# Patient Record
Sex: FEMALE | Race: ASIAN | NOT HISPANIC OR LATINO | URBAN - METROPOLITAN AREA
[De-identification: names, ages, dates, MRNs, and addresses within clinical notes are randomized per-mention and may not be internally consistent; named-entity substitution may affect disease eponyms.]

---

## 2017-08-21 ENCOUNTER — OUTPATIENT (OUTPATIENT)
Dept: OUTPATIENT SERVICES | Facility: HOSPITAL | Age: 42
LOS: 1 days | End: 2017-08-21

## 2017-08-21 VITALS
RESPIRATION RATE: 14 BRPM | WEIGHT: 149.03 LBS | HEIGHT: 61.25 IN | HEART RATE: 64 BPM | DIASTOLIC BLOOD PRESSURE: 70 MMHG | TEMPERATURE: 98 F | SYSTOLIC BLOOD PRESSURE: 104 MMHG

## 2017-08-21 DIAGNOSIS — D25.9 LEIOMYOMA OF UTERUS, UNSPECIFIED: ICD-10-CM

## 2017-08-21 DIAGNOSIS — Z01.818 ENCOUNTER FOR OTHER PREPROCEDURAL EXAMINATION: ICD-10-CM

## 2017-08-21 LAB
BLD GP AB SCN SERPL QL: NEGATIVE — SIGNIFICANT CHANGE UP
HCG SERPL-ACNC: < 5 MIU/ML — SIGNIFICANT CHANGE UP
HCT VFR BLD CALC: 39.2 % — SIGNIFICANT CHANGE UP (ref 34.5–45)
HGB BLD-MCNC: 12.8 G/DL — SIGNIFICANT CHANGE UP (ref 11.5–15.5)
MCHC RBC-ENTMCNC: 28.3 PG — SIGNIFICANT CHANGE UP (ref 27–34)
MCHC RBC-ENTMCNC: 32.7 % — SIGNIFICANT CHANGE UP (ref 32–36)
MCV RBC AUTO: 86.5 FL — SIGNIFICANT CHANGE UP (ref 80–100)
NRBC # FLD: 0 — SIGNIFICANT CHANGE UP
PLATELET # BLD AUTO: 259 K/UL — SIGNIFICANT CHANGE UP (ref 150–400)
PMV BLD: 11.2 FL — SIGNIFICANT CHANGE UP (ref 7–13)
RBC # BLD: 4.53 M/UL — SIGNIFICANT CHANGE UP (ref 3.8–5.2)
RBC # FLD: 14.1 % — SIGNIFICANT CHANGE UP (ref 10.3–14.5)
RH IG SCN BLD-IMP: POSITIVE — SIGNIFICANT CHANGE UP
WBC # BLD: 4.91 K/UL — SIGNIFICANT CHANGE UP (ref 3.8–10.5)
WBC # FLD AUTO: 4.91 K/UL — SIGNIFICANT CHANGE UP (ref 3.8–10.5)

## 2017-08-21 RX ORDER — SODIUM CHLORIDE 9 MG/ML
3 INJECTION INTRAMUSCULAR; INTRAVENOUS; SUBCUTANEOUS EVERY 8 HOURS
Qty: 0 | Refills: 0 | Status: DISCONTINUED | OUTPATIENT
Start: 2017-09-06 | End: 2017-09-08

## 2017-08-21 RX ORDER — SODIUM CHLORIDE 9 MG/ML
1000 INJECTION, SOLUTION INTRAVENOUS
Qty: 0 | Refills: 0 | Status: DISCONTINUED | OUTPATIENT
Start: 2017-09-06 | End: 2017-09-07

## 2017-08-21 NOTE — H&P PST ADULT - PROBLEM SELECTOR PLAN 1
Scheduled for abdominal myomectomy on 09/06/17. Pre op instructions, famotidine, chlorhexidine gluconate soap given and explained. Pt verbalized understanding.

## 2017-08-21 NOTE — H&P PST ADULT - NSANTHOSAYNRD_GEN_A_CORE
No. CRISTIAN screening performed.  STOP BANG Legend: 0-2 = LOW Risk; 3-4 = INTERMEDIATE Risk; 5-8 = HIGH Risk

## 2017-08-21 NOTE — H&P PST ADULT - FAMILY HISTORY
Mother  Still living? Yes, Estimated age: 61-70  Type 2 diabetes mellitus, Age at diagnosis: Age Unknown  Family history of osteoporosis in mother, Age at diagnosis: Age Unknown     Father  Still living? No  Family history of liver cancer, Age at diagnosis: Age Unknown  Family history of hepatitis C, Age at diagnosis: Age Unknown

## 2017-08-21 NOTE — H&P PST ADULT - HISTORY OF PRESENT ILLNESS
41 y/o female 43 y/o female with no significant PMH presents to PST for pre op evaluation with hx of uterine fibroids found on C t scan of abd. on 04/2017. Now scheduled for abdominal myomectomy on 09/06/17

## 2017-08-21 NOTE — H&P PST ADULT - NEGATIVE GASTROINTESTINAL SYMPTOMS
no abdominal pain/no hiccoughs/no nausea/no flatulence/no vomiting/no change in bowel habits/no melena/no diarrhea

## 2017-08-21 NOTE — H&P PST ADULT - RS GEN PE MLT RESP DETAILS PC
no intercostal retractions/no chest wall tenderness/good air movement/no rales/no subcutaneous emphysema/no rhonchi/no wheezes/breath sounds equal/airway patent/respirations non-labored

## 2017-08-21 NOTE — H&P PST ADULT - NEGATIVE NEUROLOGICAL SYMPTOMS
no vertigo/no loss of consciousness/no hemiparesis/no paresthesias/no confusion/no transient paralysis/no generalized seizures/no facial palsy/no difficulty walking/no focal seizures/no syncope/no weakness/no tremors

## 2017-08-21 NOTE — H&P PST ADULT - NEGATIVE CARDIOVASCULAR SYMPTOMS
no paroxysmal nocturnal dyspnea/no claudication/no orthopnea/no chest pain/no palpitations/no dyspnea on exertion

## 2017-09-06 ENCOUNTER — RESULT REVIEW (OUTPATIENT)
Age: 42
End: 2017-09-06

## 2017-09-06 ENCOUNTER — INPATIENT (INPATIENT)
Facility: HOSPITAL | Age: 42
LOS: 1 days | Discharge: HOME | End: 2017-09-08
Attending: OBSTETRICS & GYNECOLOGY | Admitting: OBSTETRICS & GYNECOLOGY
Payer: COMMERCIAL

## 2017-09-06 ENCOUNTER — TRANSCRIPTION ENCOUNTER (OUTPATIENT)
Age: 42
End: 2017-09-06

## 2017-09-06 VITALS
DIASTOLIC BLOOD PRESSURE: 81 MMHG | WEIGHT: 149.03 LBS | RESPIRATION RATE: 16 BRPM | SYSTOLIC BLOOD PRESSURE: 108 MMHG | OXYGEN SATURATION: 100 % | HEART RATE: 66 BPM | TEMPERATURE: 99 F | HEIGHT: 61.25 IN

## 2017-09-06 DIAGNOSIS — Z98.890 OTHER SPECIFIED POSTPROCEDURAL STATES: ICD-10-CM

## 2017-09-06 DIAGNOSIS — D25.9 LEIOMYOMA OF UTERUS, UNSPECIFIED: ICD-10-CM

## 2017-09-06 LAB
HCG UR QL: NEGATIVE — SIGNIFICANT CHANGE UP
RH IG SCN BLD-IMP: POSITIVE — SIGNIFICANT CHANGE UP

## 2017-09-06 PROCEDURE — 88305 TISSUE EXAM BY PATHOLOGIST: CPT | Mod: 26

## 2017-09-06 PROCEDURE — 88342 IMHCHEM/IMCYTCHM 1ST ANTB: CPT | Mod: 26

## 2017-09-06 RX ORDER — HYDROMORPHONE HYDROCHLORIDE 2 MG/ML
0.5 INJECTION INTRAMUSCULAR; INTRAVENOUS; SUBCUTANEOUS
Qty: 0 | Refills: 0 | Status: DISCONTINUED | OUTPATIENT
Start: 2017-09-06 | End: 2017-09-07

## 2017-09-06 RX ORDER — SODIUM CHLORIDE 9 MG/ML
1000 INJECTION, SOLUTION INTRAVENOUS
Qty: 0 | Refills: 0 | Status: DISCONTINUED | OUTPATIENT
Start: 2017-09-06 | End: 2017-09-07

## 2017-09-06 RX ORDER — HYDROMORPHONE HYDROCHLORIDE 2 MG/ML
30 INJECTION INTRAMUSCULAR; INTRAVENOUS; SUBCUTANEOUS
Qty: 0 | Refills: 0 | Status: DISCONTINUED | OUTPATIENT
Start: 2017-09-06 | End: 2017-09-07

## 2017-09-06 RX ORDER — ONDANSETRON 8 MG/1
4 TABLET, FILM COATED ORAL EVERY 6 HOURS
Qty: 0 | Refills: 0 | Status: DISCONTINUED | OUTPATIENT
Start: 2017-09-06 | End: 2017-09-08

## 2017-09-06 RX ORDER — HEPARIN SODIUM 5000 [USP'U]/ML
5000 INJECTION INTRAVENOUS; SUBCUTANEOUS EVERY 8 HOURS
Qty: 0 | Refills: 0 | Status: DISCONTINUED | OUTPATIENT
Start: 2017-09-06 | End: 2017-09-08

## 2017-09-06 RX ORDER — ONDANSETRON 8 MG/1
4 TABLET, FILM COATED ORAL ONCE
Qty: 0 | Refills: 0 | Status: DISCONTINUED | OUTPATIENT
Start: 2017-09-06 | End: 2017-09-08

## 2017-09-06 RX ORDER — NALOXONE HYDROCHLORIDE 4 MG/.1ML
0.1 SPRAY NASAL
Qty: 0 | Refills: 0 | Status: DISCONTINUED | OUTPATIENT
Start: 2017-09-06 | End: 2017-09-07

## 2017-09-06 RX ADMIN — SODIUM CHLORIDE 125 MILLILITER(S): 9 INJECTION, SOLUTION INTRAVENOUS at 17:16

## 2017-09-06 RX ADMIN — HYDROMORPHONE HYDROCHLORIDE 0.5 MILLIGRAM(S): 2 INJECTION INTRAMUSCULAR; INTRAVENOUS; SUBCUTANEOUS at 17:26

## 2017-09-06 RX ADMIN — HYDROMORPHONE HYDROCHLORIDE 30 MILLILITER(S): 2 INJECTION INTRAMUSCULAR; INTRAVENOUS; SUBCUTANEOUS at 21:37

## 2017-09-06 RX ADMIN — HYDROMORPHONE HYDROCHLORIDE 0.5 MILLIGRAM(S): 2 INJECTION INTRAMUSCULAR; INTRAVENOUS; SUBCUTANEOUS at 17:16

## 2017-09-06 RX ADMIN — HYDROMORPHONE HYDROCHLORIDE 30 MILLILITER(S): 2 INJECTION INTRAMUSCULAR; INTRAVENOUS; SUBCUTANEOUS at 17:45

## 2017-09-06 RX ADMIN — HEPARIN SODIUM 5000 UNIT(S): 5000 INJECTION INTRAVENOUS; SUBCUTANEOUS at 23:01

## 2017-09-06 RX ADMIN — SODIUM CHLORIDE 3 MILLILITER(S): 9 INJECTION INTRAMUSCULAR; INTRAVENOUS; SUBCUTANEOUS at 21:04

## 2017-09-06 RX ADMIN — HYDROMORPHONE HYDROCHLORIDE 0.5 MILLIGRAM(S): 2 INJECTION INTRAMUSCULAR; INTRAVENOUS; SUBCUTANEOUS at 17:42

## 2017-09-06 NOTE — PROGRESS NOTE ADULT - PROBLEM SELECTOR PLAN 1
Neuro: PCA for pain  CV: Hemodynamically stable. f/u AM CBC  Pulm: Saturating well on room air, encourage oob/amb  GI:  Clear liquid diet.  consider advancing to regular diet in AM.   : UOP adequate, continue meredith  Heme: c/w HSQ and SCDs for DVT ppx  Dispo: Continue routine post-op care    Suzan Tonny PGY-2

## 2017-09-06 NOTE — PROGRESS NOTE ADULT - SUBJECTIVE AND OBJECTIVE BOX
R2 GYN POST OP   POD#0      Patient seen and examined at bedside.  Reports pain well controlled.  Patient is ambulating minimally.  Has only had a few sips of water.  Reports some nausea- no emesis.    Has not yet passed  Gutiérrez is still in place.   Denies CP, SOB, fevers, and chills.    Vital Signs Last 24 Hours  T(C): 36.5 (09-06-17 @ 20:27), Max: 37.2 (09-06-17 @ 13:06)  HR: 74 (09-06-17 @ 20:27) (64 - 77)  BP: 122/71 (09-06-17 @ 20:27) (108/81 - 137/85)  RR: 20 (09-06-17 @ 20:27) (13 - 20)  SpO2: 98% (09-06-17 @ 20:27) (97% - 100%)    I&O's Summary    06 Sep 2017 07:01  -  06 Sep 2017 22:00  --------------------------------------------------------  IN: 575 mL / OUT: 65 mL / NET: 510 mL        Physical Exam:  General: NAD  CV: RR, S1, S2, no M/R/G  Lungs: CTA b/l, good air flow b/l   Abdomen: Soft, appropriately-tender, softly distended, +BS  Incision: low transverse incision- CDI  Ext: No pain or swelling       MEDICATIONS  (STANDING):  sodium chloride 0.9% lock flush 3 milliLiter(s) IV Push every 8 hours  lactated ringers. 1000 milliLiter(s) (30 mL/Hr) IV Continuous <Continuous>  heparin  Injectable 5000 Unit(s) SubCutaneous every 8 hours  lactated ringers. 1000 milliLiter(s) (125 mL/Hr) IV Continuous <Continuous>  HYDROmorphone PCA (1 mG/mL) 30 milliLiter(s) PCA Continuous PCA Continuous    MEDICATIONS  (PRN):  naloxone Injectable 0.1 milliGRAM(s) IV Push every 3 minutes PRN For ANY of the following changes in patient status:  A. RR LESS THAN 10 breaths per minute, B. Oxygen saturation LESS THAN 90%, C. Sedation score of 6  ondansetron Injectable 4 milliGRAM(s) IV Push every 6 hours PRN Nausea  HYDROmorphone  Injectable 0.5 milliGRAM(s) IV Push every 10 minutes PRN Severe Pain (7 - 10)  ondansetron Injectable 4 milliGRAM(s) IV Push once PRN Nausea and/or Vomiting  HYDROmorphone PCA (1 mG/mL) Rescue Clinician Bolus 0.5 milliGRAM(s) IV Push every 15 minutes PRN for Pain Scale GREATER THAN 6  naloxone Injectable 0.1 milliGRAM(s) IV Push every 3 minutes PRN For ANY of the following changes in patient status:  A. RR LESS THAN 10 breaths per minute, B. Oxygen saturation LESS THAN 90%, C. Sedation score of 6  ondansetron Injectable 4 milliGRAM(s) IV Push every 6 hours PRN Nausea

## 2017-09-07 ENCOUNTER — TRANSCRIPTION ENCOUNTER (OUTPATIENT)
Age: 42
End: 2017-09-07

## 2017-09-07 LAB
HCT VFR BLD CALC: 36.2 % — SIGNIFICANT CHANGE UP (ref 34.5–45)
HGB BLD-MCNC: 11.8 G/DL — SIGNIFICANT CHANGE UP (ref 11.5–15.5)
MCHC RBC-ENTMCNC: 27.7 PG — SIGNIFICANT CHANGE UP (ref 27–34)
MCHC RBC-ENTMCNC: 32.6 % — SIGNIFICANT CHANGE UP (ref 32–36)
MCV RBC AUTO: 85 FL — SIGNIFICANT CHANGE UP (ref 80–100)
NRBC # FLD: 0 — SIGNIFICANT CHANGE UP
PLATELET # BLD AUTO: 229 K/UL — SIGNIFICANT CHANGE UP (ref 150–400)
PMV BLD: 11.3 FL — SIGNIFICANT CHANGE UP (ref 7–13)
RBC # BLD: 4.26 M/UL — SIGNIFICANT CHANGE UP (ref 3.8–5.2)
RBC # FLD: 14.1 % — SIGNIFICANT CHANGE UP (ref 10.3–14.5)
WBC # BLD: 11.56 K/UL — HIGH (ref 3.8–10.5)
WBC # FLD AUTO: 11.56 K/UL — HIGH (ref 3.8–10.5)

## 2017-09-07 RX ORDER — ACETAMINOPHEN 500 MG
975 TABLET ORAL EVERY 6 HOURS
Qty: 0 | Refills: 0 | Status: DISCONTINUED | OUTPATIENT
Start: 2017-09-07 | End: 2017-09-08

## 2017-09-07 RX ORDER — OXYCODONE HYDROCHLORIDE 5 MG/1
5 TABLET ORAL EVERY 4 HOURS
Qty: 0 | Refills: 0 | Status: DISCONTINUED | OUTPATIENT
Start: 2017-09-07 | End: 2017-09-08

## 2017-09-07 RX ORDER — IBUPROFEN 200 MG
600 TABLET ORAL
Qty: 0 | Refills: 0 | Status: DISCONTINUED | OUTPATIENT
Start: 2017-09-07 | End: 2017-09-07

## 2017-09-07 RX ORDER — IBUPROFEN 200 MG
600 TABLET ORAL EVERY 6 HOURS
Qty: 0 | Refills: 0 | Status: DISCONTINUED | OUTPATIENT
Start: 2017-09-07 | End: 2017-09-08

## 2017-09-07 RX ADMIN — SODIUM CHLORIDE 3 MILLILITER(S): 9 INJECTION INTRAMUSCULAR; INTRAVENOUS; SUBCUTANEOUS at 13:26

## 2017-09-07 RX ADMIN — SODIUM CHLORIDE 125 MILLILITER(S): 9 INJECTION, SOLUTION INTRAVENOUS at 07:40

## 2017-09-07 RX ADMIN — SODIUM CHLORIDE 3 MILLILITER(S): 9 INJECTION INTRAMUSCULAR; INTRAVENOUS; SUBCUTANEOUS at 23:16

## 2017-09-07 RX ADMIN — Medication 600 MILLIGRAM(S): at 18:34

## 2017-09-07 RX ADMIN — HEPARIN SODIUM 5000 UNIT(S): 5000 INJECTION INTRAVENOUS; SUBCUTANEOUS at 06:55

## 2017-09-07 RX ADMIN — Medication 600 MILLIGRAM(S): at 18:02

## 2017-09-07 RX ADMIN — ONDANSETRON 4 MILLIGRAM(S): 8 TABLET, FILM COATED ORAL at 18:05

## 2017-09-07 RX ADMIN — Medication 975 MILLIGRAM(S): at 18:01

## 2017-09-07 RX ADMIN — HYDROMORPHONE HYDROCHLORIDE 30 MILLILITER(S): 2 INJECTION INTRAMUSCULAR; INTRAVENOUS; SUBCUTANEOUS at 07:21

## 2017-09-07 RX ADMIN — SODIUM CHLORIDE 3 MILLILITER(S): 9 INJECTION INTRAMUSCULAR; INTRAVENOUS; SUBCUTANEOUS at 05:32

## 2017-09-07 RX ADMIN — HEPARIN SODIUM 5000 UNIT(S): 5000 INJECTION INTRAVENOUS; SUBCUTANEOUS at 14:46

## 2017-09-07 NOTE — PROGRESS NOTE ADULT - ASSESSMENT
POD #1  abdominal myomectomy  not tolerating PCA  bruising at incision, no mass     PLAN  d/c PCA  change to PO  increase ambulation  observation of incision     WALI Oliva

## 2017-09-07 NOTE — DISCHARGE NOTE ADULT - MEDICATION SUMMARY - MEDICATIONS TO CHANGE
I will SWITCH the dose or number of times a day I take the medications listed below when I get home from the hospital:    oxyCODONE 5 mg oral tablet  -- 1 tab(s) by mouth every 6 hours, As Needed -for severe pain MDD:4  -- Caution federal law prohibits the transfer of this drug to any person other  than the person for whom it was prescribed.  It is very important that you take or use this exactly as directed.  Do not skip doses or discontinue unless directed by your doctor.  May cause drowsiness.  Alcohol may intensify this effect.  Use care when operating dangerous machinery.  This prescription cannot be refilled.  Using more of this medication than prescribed may cause serious breathing problems.    acetaminophen 325 mg oral tablet  -- 3 tab(s) by mouth every 6 hours    ibuprofen 600 mg oral tablet  -- 1 tab(s) by mouth every 6 hours

## 2017-09-07 NOTE — DISCHARGE NOTE ADULT - CARE PROVIDER_API CALL
Torri Oliva), Obstetrics and Gynecology  [address]  [address]  [place] [address]  Phone: [phone]  Fax: [phone]

## 2017-09-07 NOTE — DISCHARGE NOTE ADULT - MEDICATION SUMMARY - MEDICATIONS TO TAKE
I will START or STAY ON the medications listed below when I get home from the hospital:    Pre She vitamin  1 tab orally daily  last dose   --     -- Indication: For home med    Calcium with Vitamin D  750 mg orally daily  --     -- Indication: For home med    Vitamin B  1 tab orally daily  --     -- Indication: For home med    oxyCODONE 5 mg oral tablet  -- 1 tab(s) by mouth every 6 hours, As Needed -for severe pain MDD:4  -- Caution federal law prohibits the transfer of this drug to any person other  than the person for whom it was prescribed.  It is very important that you take or use this exactly as directed.  Do not skip doses or discontinue unless directed by your doctor.  May cause drowsiness.  Alcohol may intensify this effect.  Use care when operating dangerous machinery.  This prescription cannot be refilled.  Using more of this medication than prescribed may cause serious breathing problems.    -- Indication: For severe pain    acetaminophen 325 mg oral tablet  -- 3 tab(s) by mouth every 6 hours  -- Indication: For Pain    ibuprofen 600 mg oral tablet  -- 1 tab(s) by mouth every 6 hours  -- Indication: For Pain

## 2017-09-07 NOTE — PROGRESS NOTE ADULT - SUBJECTIVE AND OBJECTIVE BOX
INTERVAL HPI/OVERNIGHT EVENTS: Pt seen and examined at bedside.  Pt complains of pain at incision, right side feels hard. Feels dizzy when she pushes the PCA.   She denies N/V, tolerating regular. No flatus     MEDICATIONS  (STANDING):  sodium chloride 0.9% lock flush 3 milliLiter(s) IV Push every 8 hours  lactated ringers. 1000 milliLiter(s) (30 mL/Hr) IV Continuous <Continuous>  heparin  Injectable 5000 Unit(s) SubCutaneous every 8 hours  lactated ringers. 1000 milliLiter(s) (125 mL/Hr) IV Continuous <Continuous>  HYDROmorphone PCA (1 mG/mL) 30 milliLiter(s) PCA Continuous PCA Continuous    MEDICATIONS  (PRN):  naloxone Injectable 0.1 milliGRAM(s) IV Push every 3 minutes PRN For ANY of the following changes in patient status:  A. RR LESS THAN 10 breaths per minute, B. Oxygen saturation LESS THAN 90%, C. Sedation score of 6  ondansetron Injectable 4 milliGRAM(s) IV Push every 6 hours PRN Nausea  HYDROmorphone  Injectable 0.5 milliGRAM(s) IV Push every 10 minutes PRN Severe Pain (7 - 10)  ondansetron Injectable 4 milliGRAM(s) IV Push once PRN Nausea and/or Vomiting  HYDROmorphone PCA (1 mG/mL) Rescue Clinician Bolus 0.5 milliGRAM(s) IV Push every 15 minutes PRN for Pain Scale GREATER THAN 6  naloxone Injectable 0.1 milliGRAM(s) IV Push every 3 minutes PRN For ANY of the following changes in patient status:  A. RR LESS THAN 10 breaths per minute, B. Oxygen saturation LESS THAN 90%, C. Sedation score of 6  ondansetron Injectable 4 milliGRAM(s) IV Push every 6 hours PRN Nausea      Allergies    No Known Allergies    Intolerances        12 point ROS negative except as outlined above    Vital Signs Last 24 Hrs  T(C): 36.8 (07 Sep 2017 13:24), Max: 36.8 (06 Sep 2017 17:00)  T(F): 98.2 (07 Sep 2017 13:24), Max: 98.3 (07 Sep 2017 09:40)  HR: 83 (07 Sep 2017 13:24) (64 - 83)  BP: 105/62 (07 Sep 2017 13:24) (104/62 - 137/85)  BP(mean): --  RR: 16 (07 Sep 2017 13:24) (13 - 20)  SpO2: 100% (07 Sep 2017 13:24) (93% - 100%)    Last Menstrual Period      PHYSICAL EXAM:    GA: NAD, A+0 x 3  CV: RRR  Abd: ( + ) BS, soft, nontender, nondistended, no rebound or guarding,   Incision: clean, dry and intact; 1 cm echymoses at right incision, no mass  Extremities: no swelling or calf tenderness, reflexes +2 bilaterally          LABS:                        11.8   11.56 )-----------( 229      ( 07 Sep 2017 05:30 )             36.2                 RADIOLOGY & ADDITIONAL TESTS:

## 2017-09-07 NOTE — DISCHARGE NOTE ADULT - INSTRUCTIONS
Advance as tolerated Notify MD if experiencing fever, nausea, vomiting, increased pain, or bleeding.

## 2017-09-07 NOTE — PROGRESS NOTE ADULT - SUBJECTIVE AND OBJECTIVE BOX
Patient seen and examined at bedside, no acute overnight events. No acute complaints, pain well controlled.  Patient is ambulating and tolerating clears. Has not yet passed flatus. Gutiérrez is still in place. Denies CP, SOB, N/V, fevers, and chills.    Vital Signs Last 24 Hours  T(C): 36.6 (09-07-17 @ 05:32), Max: 37.2 (09-06-17 @ 13:06)  HR: 71 (09-07-17 @ 05:32) (64 - 77)  BP: 114/56 (09-07-17 @ 05:32) (104/63 - 137/85)  RR: 20 (09-07-17 @ 05:32) (13 - 20)  SpO2: 100% (09-07-17 @ 05:32) (97% - 100%)    I&O's Summary    06 Sep 2017 07:01  -  07 Sep 2017 06:31  --------------------------------------------------------  IN: 950 mL / OUT: 1165 mL / NET: -215 mL        Physical Exam:  General: NAD  CV: NR, RR, S1, S2, no M/R/G  Lungs: CTA-B  Abdomen: Soft, non-tender, non-distended, +BS  Incision: Pfannenstiel incision, CDI, no discharge or bleeding  Ext: No pain or swelling    Labs:             11.8   11.56<H> )-----------( 229      ( 09-07 @ 05:30 )             36.2         MEDICATIONS  (STANDING):  sodium chloride 0.9% lock flush 3 milliLiter(s) IV Push every 8 hours  lactated ringers. 1000 milliLiter(s) (30 mL/Hr) IV Continuous <Continuous>  heparin  Injectable 5000 Unit(s) SubCutaneous every 8 hours  lactated ringers. 1000 milliLiter(s) (125 mL/Hr) IV Continuous <Continuous>  HYDROmorphone PCA (1 mG/mL) 30 milliLiter(s) PCA Continuous PCA Continuous    MEDICATIONS  (PRN):  naloxone Injectable 0.1 milliGRAM(s) IV Push every 3 minutes PRN For ANY of the following changes in patient status:  A. RR LESS THAN 10 breaths per minute, B. Oxygen saturation LESS THAN 90%, C. Sedation score of 6  ondansetron Injectable 4 milliGRAM(s) IV Push every 6 hours PRN Nausea  HYDROmorphone  Injectable 0.5 milliGRAM(s) IV Push every 10 minutes PRN Severe Pain (7 - 10)  ondansetron Injectable 4 milliGRAM(s) IV Push once PRN Nausea and/or Vomiting  HYDROmorphone PCA (1 mG/mL) Rescue Clinician Bolus 0.5 milliGRAM(s) IV Push every 15 minutes PRN for Pain Scale GREATER THAN 6  naloxone Injectable 0.1 milliGRAM(s) IV Push every 3 minutes PRN For ANY of the following changes in patient status:  A. RR LESS THAN 10 breaths per minute, B. Oxygen saturation LESS THAN 90%, C. Sedation score of 6  ondansetron Injectable 4 milliGRAM(s) IV Push every 6 hours PRN Nausea      Sherry Kenney MD

## 2017-09-07 NOTE — DISCHARGE NOTE ADULT - PATIENT PORTAL LINK FT
“You can access the Follow[employer] Patient Portal, offered by [facility], by registering with the following website: [email or web address]”

## 2017-09-07 NOTE — PROGRESS NOTE ADULT - PROBLEM SELECTOR PLAN 1
Neuro: transition to po pain meds after patient is tolerating po  CV: Hemodynamically stable  Pulm: Saturating well on room air, encourage incentive spirometry  GI: Advance to regular diet  : UOP adequate, d/c masoud  Heme: c/w HSQ and SCDs for DVT ppx  Dispo: Continue routine post-op care, plan to dispo either tonight or tomorrow morning    Sherry Kenney PGY-1

## 2017-09-07 NOTE — DISCHARGE NOTE ADULT - CARE PLAN
Goal:	Continuation of ADLs  Instructions for follow-up, activity and diet:	Advance diet and activity as tolerated

## 2017-09-07 NOTE — DISCHARGE NOTE ADULT - CONDITIONS AT DISCHARGE
Pt ambulating and voiding without difficulty. Pt tolerates diet. Pt's VSS. Pt is A+OX3. Pt has lower transverse abd incision with dermabond. Pt's incision is without bleeding and is well-approximated.

## 2017-09-07 NOTE — DISCHARGE NOTE ADULT - HOSPITAL COURSE
43 yo F s/p abdominal myomectomy for uterine leiomyomas. EBL 200cc 43 yo F s/p abdominal myomectomy for uterine leiomyomas. EBL 200cc. Hct 39.2->36.2. POD#0 Patient tolerated the procedure well and was advanced to clear diet without complication. Pain was controlled with PCA. POD#1 Patient was advanced to regular diet. Patient experienced some edema and erythema near the incision that was resolved with hot packs. PCA was d/c'd and pain was controlled on oral analgesics. POD#2 No acute complaints. Patient ambulating and voiding spontaneously.    Patient discharged POD#2. Patient to have close follow-up with Dr. Oliva.

## 2017-09-08 VITALS
SYSTOLIC BLOOD PRESSURE: 105 MMHG | RESPIRATION RATE: 18 BRPM | OXYGEN SATURATION: 100 % | HEART RATE: 80 BPM | DIASTOLIC BLOOD PRESSURE: 62 MMHG | TEMPERATURE: 98 F

## 2017-09-08 RX ORDER — OXYCODONE HYDROCHLORIDE 5 MG/1
1 TABLET ORAL
Qty: 20 | Refills: 0
Start: 2017-09-08

## 2017-09-08 RX ORDER — ACETAMINOPHEN 500 MG
3 TABLET ORAL
Qty: 0 | Refills: 0 | DISCHARGE
Start: 2017-09-08

## 2017-09-08 RX ORDER — IBUPROFEN 200 MG
1 TABLET ORAL
Qty: 0 | Refills: 0 | DISCHARGE
Start: 2017-09-08

## 2017-09-08 RX ADMIN — Medication 600 MILLIGRAM(S): at 00:09

## 2017-09-08 RX ADMIN — HEPARIN SODIUM 5000 UNIT(S): 5000 INJECTION INTRAVENOUS; SUBCUTANEOUS at 09:06

## 2017-09-08 RX ADMIN — Medication 600 MILLIGRAM(S): at 06:40

## 2017-09-08 RX ADMIN — Medication 975 MILLIGRAM(S): at 06:09

## 2017-09-08 RX ADMIN — Medication 975 MILLIGRAM(S): at 00:09

## 2017-09-08 RX ADMIN — HEPARIN SODIUM 5000 UNIT(S): 5000 INJECTION INTRAVENOUS; SUBCUTANEOUS at 00:08

## 2017-09-08 RX ADMIN — SODIUM CHLORIDE 3 MILLILITER(S): 9 INJECTION INTRAMUSCULAR; INTRAVENOUS; SUBCUTANEOUS at 06:24

## 2017-09-08 RX ADMIN — Medication 600 MILLIGRAM(S): at 06:09

## 2017-09-08 RX ADMIN — Medication 600 MILLIGRAM(S): at 01:00

## 2017-09-08 NOTE — PROGRESS NOTE ADULT - PROBLEM SELECTOR PLAN 1
Neuro: c/w po pain meds  CV: Hemodynamically stable  Pulm: Saturating well on room air, encourage incentive spirometry  GI: c/w regular diet  : voiding spontaneously  Heme: c/w HSQ and SCDs for DVT ppx  Dispo: Continue routine post-op care. Discharge home today on current oral pain medications     Sherry Kenney PGY-1

## 2017-09-08 NOTE — PROGRESS NOTE ADULT - SUBJECTIVE AND OBJECTIVE BOX
POD#2   HD#3    Patient seen and examined at bedside. No acute overnight events. No acute complaints, pain well controlled. Edema and erythema around the superior R portion of her incision has resolved with hot packs and she is not longer experiencing localized tenderness. Patient is ambulating. She is passing flatus. Voiding spontaneously and tolerating regular diet. Denies CP, SOB, N/V, fevers, and chills.    Vital Signs Last 24 Hours  T(C): 36.4 (09-08-17 @ 06:23), Max: 37.1 (09-07-17 @ 21:10)  HR: 84 (09-08-17 @ 06:23) (70 - 90)  BP: 96/51 (09-08-17 @ 06:23) (91/49 - 105/62)  RR: 18 (09-08-17 @ 06:23) (16 - 181)  SpO2: 97% (09-08-17 @ 06:23) (93% - 100%)    I&O's Summary    06 Sep 2017 07:01  -  07 Sep 2017 07:00  --------------------------------------------------------  IN: 950 mL / OUT: 1165 mL / NET: -215 mL    07 Sep 2017 07:01  -  08 Sep 2017 06:45  --------------------------------------------------------  IN: 0 mL / OUT: 1350 mL / NET: -1350 mL        Physical Exam:  General: NAD  CV: RRR  Lungs: CTAB  Abdomen: Soft, non-tender, non-distended, +BS  Incision: Pfannenstiel incision, C/D/I, no edema or erythema  Ext: No pain or swelling    Labs:                        11.8   11.56 )-----------( 229      ( 07 Sep 2017 05:30 )             36.2   baso x      eos x      imm gran x      lymph x      mono x      poly x          MEDICATIONS  (STANDING):  sodium chloride 0.9% lock flush 3 milliLiter(s) IV Push every 8 hours  heparin  Injectable 5000 Unit(s) SubCutaneous every 8 hours  acetaminophen   Tablet 975 milliGRAM(s) Oral every 6 hours  ibuprofen  Tablet 600 milliGRAM(s) Oral every 6 hours  oxyCODONE    IR 5 milliGRAM(s) Oral every 4 hours    MEDICATIONS  (PRN):  ondansetron Injectable 4 milliGRAM(s) IV Push every 6 hours PRN Nausea  ondansetron Injectable 4 milliGRAM(s) IV Push once PRN Nausea and/or Vomiting  ondansetron Injectable 4 milliGRAM(s) IV Push every 6 hours PRN Nausea      A/P:     Neuro: Continue po pain meds  CV: Hemodynamically stable  Pulm: Saturating well on room air, encourage oob/amb  GI: Advance as tolerated  : UOP adequate, voiding freely  Heme: c/w HSQ and SCDs for DVT ppx  Dispo: Continue routine post-op care    Sherry Kenney MD

## 2017-12-19 PROBLEM — Z00.00 ENCOUNTER FOR PREVENTIVE HEALTH EXAMINATION: Status: ACTIVE | Noted: 2017-12-19

## 2018-01-02 ENCOUNTER — APPOINTMENT (OUTPATIENT)
Dept: HUMAN REPRODUCTION | Facility: CLINIC | Age: 43
End: 2018-01-02
Payer: COMMERCIAL

## 2018-01-02 PROCEDURE — 99205 OFFICE O/P NEW HI 60 MIN: CPT

## 2018-04-06 ENCOUNTER — RX RENEWAL (OUTPATIENT)
Age: 43
End: 2018-04-06

## 2018-04-06 DIAGNOSIS — N97.9 FEMALE INFERTILITY, UNSPECIFIED: ICD-10-CM

## 2018-04-06 RX ORDER — CONTAINER,EMPTY
EACH MISCELLANEOUS
Qty: 1 | Refills: 0 | Status: ACTIVE | COMMUNITY
Start: 2018-04-06 | End: 1900-01-01

## 2018-04-06 RX ORDER — CHORIONIC GONADOTROPIN 10000 UNIT
10000 KIT INTRAMUSCULAR
Qty: 1 | Refills: 0 | Status: ACTIVE | COMMUNITY
Start: 2018-04-06 | End: 1900-01-01

## 2018-04-06 RX ORDER — NEEDLES, DISPOSABLE 25GX5/8"
27G X 1/2" NEEDLE, DISPOSABLE MISCELLANEOUS
Qty: 30 | Refills: 0 | Status: ACTIVE | COMMUNITY
Start: 2018-04-06 | End: 1900-01-01

## 2018-04-06 RX ORDER — GANIRELIX ACETATE 250 UG/.5ML
250 INJECTION, SOLUTION SUBCUTANEOUS
Qty: 9 | Refills: 2 | Status: ACTIVE | COMMUNITY
Start: 2018-04-06 | End: 1900-01-01

## 2018-04-06 RX ORDER — MENOTROPINS 75 UNIT
75 KIT SUBCUTANEOUS
Qty: 45 | Refills: 2 | Status: ACTIVE | COMMUNITY
Start: 2018-04-06

## 2018-04-06 RX ORDER — ISOPROPYL ALCOHOL 70 ML/100ML
SWAB TOPICAL
Qty: 30 | Refills: 0 | Status: ACTIVE | COMMUNITY
Start: 2018-04-06 | End: 1900-01-01

## 2018-04-06 RX ORDER — FOLLITROPIN ALFA 1050 UNIT
1050 KIT SUBCUTANEOUS
Qty: 4 | Refills: 2 | Status: ACTIVE | COMMUNITY
Start: 2018-04-06 | End: 1900-01-01

## 2018-04-06 RX ORDER — ESTRADIOL 0.1 MG/D
0.1 PATCH, EXTENDED RELEASE TRANSDERMAL
Qty: 2 | Refills: 0 | Status: ACTIVE | COMMUNITY
Start: 2018-04-06 | End: 1900-01-01

## 2018-04-06 RX ORDER — NEEDLES, FILTER 19GX1 1/2"
22G X 1-1/2" NEEDLE, DISPOSABLE MISCELLANEOUS
Qty: 30 | Refills: 1 | Status: ACTIVE | COMMUNITY
Start: 2018-04-06 | End: 1900-01-01

## 2018-04-09 ENCOUNTER — APPOINTMENT (OUTPATIENT)
Dept: HUMAN REPRODUCTION | Facility: CLINIC | Age: 43
End: 2018-04-09
Payer: COMMERCIAL

## 2018-04-09 PROCEDURE — 99215 OFFICE O/P EST HI 40 MIN: CPT

## 2019-07-15 ENCOUNTER — RESULT REVIEW (OUTPATIENT)
Age: 44
End: 2019-07-15

## 2019-08-17 ENCOUNTER — OUTPATIENT (OUTPATIENT)
Dept: OUTPATIENT SERVICES | Facility: HOSPITAL | Age: 44
LOS: 1 days | End: 2019-08-17

## 2019-08-17 VITALS
HEIGHT: 62 IN | OXYGEN SATURATION: 99 % | TEMPERATURE: 98 F | SYSTOLIC BLOOD PRESSURE: 112 MMHG | DIASTOLIC BLOOD PRESSURE: 78 MMHG | WEIGHT: 151.9 LBS | RESPIRATION RATE: 16 BRPM | HEART RATE: 58 BPM

## 2019-08-17 DIAGNOSIS — Z98.890 OTHER SPECIFIED POSTPROCEDURAL STATES: Chronic | ICD-10-CM

## 2019-08-17 DIAGNOSIS — N84.0 POLYP OF CORPUS UTERI: ICD-10-CM

## 2019-08-17 LAB
ANION GAP SERPL CALC-SCNC: 21 MMO/L — HIGH (ref 7–14)
BUN SERPL-MCNC: 10 MG/DL — SIGNIFICANT CHANGE UP (ref 7–23)
CALCIUM SERPL-MCNC: 10.1 MG/DL — SIGNIFICANT CHANGE UP (ref 8.4–10.5)
CHLORIDE SERPL-SCNC: 99 MMOL/L — SIGNIFICANT CHANGE UP (ref 98–107)
CO2 SERPL-SCNC: 21 MMOL/L — LOW (ref 22–31)
CREAT SERPL-MCNC: 0.62 MG/DL — SIGNIFICANT CHANGE UP (ref 0.5–1.3)
GLUCOSE SERPL-MCNC: 77 MG/DL — SIGNIFICANT CHANGE UP (ref 70–99)
HCT VFR BLD CALC: 44 % — SIGNIFICANT CHANGE UP (ref 34.5–45)
HGB BLD-MCNC: 13.8 G/DL — SIGNIFICANT CHANGE UP (ref 11.5–15.5)
MCHC RBC-ENTMCNC: 28.5 PG — SIGNIFICANT CHANGE UP (ref 27–34)
MCHC RBC-ENTMCNC: 31.4 % — LOW (ref 32–36)
MCV RBC AUTO: 90.7 FL — SIGNIFICANT CHANGE UP (ref 80–100)
NRBC # FLD: 0 K/UL — SIGNIFICANT CHANGE UP (ref 0–0)
PLATELET # BLD AUTO: 271 K/UL — SIGNIFICANT CHANGE UP (ref 150–400)
PMV BLD: 11.5 FL — SIGNIFICANT CHANGE UP (ref 7–13)
POTASSIUM SERPL-MCNC: 3.7 MMOL/L — SIGNIFICANT CHANGE UP (ref 3.5–5.3)
POTASSIUM SERPL-SCNC: 3.7 MMOL/L — SIGNIFICANT CHANGE UP (ref 3.5–5.3)
RBC # BLD: 4.85 M/UL — SIGNIFICANT CHANGE UP (ref 3.8–5.2)
RBC # FLD: 13.7 % — SIGNIFICANT CHANGE UP (ref 10.3–14.5)
SODIUM SERPL-SCNC: 141 MMOL/L — SIGNIFICANT CHANGE UP (ref 135–145)
WBC # BLD: 6.27 K/UL — SIGNIFICANT CHANGE UP (ref 3.8–10.5)
WBC # FLD AUTO: 6.27 K/UL — SIGNIFICANT CHANGE UP (ref 3.8–10.5)

## 2019-08-17 RX ORDER — SODIUM CHLORIDE 9 MG/ML
1000 INJECTION, SOLUTION INTRAVENOUS
Refills: 0 | Status: DISCONTINUED | OUTPATIENT
Start: 2019-08-30 | End: 2019-09-14

## 2019-08-17 NOTE — H&P PST ADULT - HISTORY OF PRESENT ILLNESS
44 y.o. female , LMP 19, reports undergoing IVF tx, c/o spotting between period in 2019, followed by transvaginal ultrasound, diagnosed with uterine polyps, denies pelvic pain, cramping, presents to PST for evaluation for Hysteroscopy with Tarshahion on 19

## 2019-08-17 NOTE — H&P PST ADULT - NEGATIVE MUSCULOSKELETAL SYMPTOMS
no back pain/no joint swelling/no myalgia/no stiffness/no neck pain/no arthritis/no muscle cramps/no muscle weakness

## 2019-08-17 NOTE — H&P PST ADULT - NSICDXPASTSURGICALHX_GEN_ALL_CORE_FT
Pt resides in [place], pt visiting family in [place], staying with cousin in private home, about 10 steps to enter with handrail, pt set up on first level. PTA independent in all mobility and ADL's. Pt owns cane (does not use).
PAST SURGICAL HISTORY:  H/O myomectomy 09/2017

## 2019-08-17 NOTE — H&P PST ADULT - NEGATIVE ENMT SYMPTOMS
no nasal congestion/no throat pain/no dysphagia/no post-nasal discharge/no hearing difficulty/no sinus symptoms

## 2019-08-17 NOTE — H&P PST ADULT - NSICDXFAMILYHX_GEN_ALL_CORE_FT
FAMILY HISTORY:  Father  Still living? No  Family history of hepatitis C, Age at diagnosis: Age Unknown  Family history of liver cancer, Age at diagnosis: Age Unknown    Mother  Still living? Yes, Estimated age: 61-70  Family history of osteoporosis in mother, Age at diagnosis: Age Unknown  Type 2 diabetes mellitus, Age at diagnosis: Age Unknown

## 2019-08-17 NOTE — H&P PST ADULT - MUSCULOSKELETAL
detailed exam no joint warmth/ROM intact/no joint swelling/no joint erythema/no calf tenderness/normal strength details…

## 2019-08-17 NOTE — H&P PST ADULT - NSICDXPROBLEM_GEN_ALL_CORE_FT
PROBLEM DIAGNOSES  Problem: Polyp of corpus uteri  Assessment and Plan: pt scheduled for Hysteroscopy with Rosalina on 08/30/19  Preop instructions provided. Pt verbalized understanding.

## 2019-08-17 NOTE — H&P PST ADULT - NEGATIVE GENERAL GENITOURINARY SYMPTOMS
no incontinence/no renal colic/no flank pain L/no bladder infections/no dysuria/no flank pain R/no hematuria

## 2019-08-18 LAB — HCG SERPL-ACNC: < 5 MIU/ML — SIGNIFICANT CHANGE UP

## 2019-08-29 ENCOUNTER — TRANSCRIPTION ENCOUNTER (OUTPATIENT)
Age: 44
End: 2019-08-29

## 2019-08-30 ENCOUNTER — OUTPATIENT (OUTPATIENT)
Dept: OUTPATIENT SERVICES | Facility: HOSPITAL | Age: 44
LOS: 1 days | Discharge: HOME | End: 2019-08-30
Payer: COMMERCIAL

## 2019-08-30 ENCOUNTER — RESULT REVIEW (OUTPATIENT)
Age: 44
End: 2019-08-30

## 2019-08-30 VITALS
HEART RATE: 66 BPM | DIASTOLIC BLOOD PRESSURE: 78 MMHG | OXYGEN SATURATION: 98 % | SYSTOLIC BLOOD PRESSURE: 112 MMHG | TEMPERATURE: 98 F | RESPIRATION RATE: 20 BRPM

## 2019-08-30 VITALS
RESPIRATION RATE: 16 BRPM | TEMPERATURE: 98 F | HEART RATE: 61 BPM | WEIGHT: 151.9 LBS | SYSTOLIC BLOOD PRESSURE: 124 MMHG | OXYGEN SATURATION: 100 % | HEIGHT: 62 IN | DIASTOLIC BLOOD PRESSURE: 71 MMHG

## 2019-08-30 DIAGNOSIS — N84.0 POLYP OF CORPUS UTERI: ICD-10-CM

## 2019-08-30 DIAGNOSIS — Z98.890 OTHER SPECIFIED POSTPROCEDURAL STATES: Chronic | ICD-10-CM

## 2019-08-30 LAB — HCG UR QL: NEGATIVE — SIGNIFICANT CHANGE UP

## 2019-08-30 PROCEDURE — 88305 TISSUE EXAM BY PATHOLOGIST: CPT | Mod: 26

## 2019-08-30 RX ORDER — SODIUM CHLORIDE 9 MG/ML
1000 INJECTION, SOLUTION INTRAVENOUS
Refills: 0 | Status: DISCONTINUED | OUTPATIENT
Start: 2019-08-30 | End: 2019-09-14

## 2019-08-30 NOTE — ASU DISCHARGE PLAN (ADULT/PEDIATRIC) - CARE PROVIDER_API CALL
Torri Oliva)  Obstetrics and Gynecology  [address]  [place] [address]  Phone: [phone]  Fax: [phone]  Follow Up Time:

## 2019-08-31 ENCOUNTER — TRANSCRIPTION ENCOUNTER (OUTPATIENT)
Age: 44
End: 2019-08-31

## 2019-09-13 LAB — SURGICAL PATHOLOGY STUDY: SIGNIFICANT CHANGE UP

## 2020-02-28 ENCOUNTER — RESULT REVIEW (OUTPATIENT)
Age: 45
End: 2020-02-28

## 2020-03-09 ENCOUNTER — APPOINTMENT (OUTPATIENT)
Dept: SURGERY | Facility: CLINIC | Age: 45
End: 2020-03-09

## 2020-03-12 ENCOUNTER — OUTPATIENT (OUTPATIENT)
Dept: OUTPATIENT SERVICES | Facility: HOSPITAL | Age: 45
LOS: 1 days | End: 2020-03-12
Payer: COMMERCIAL

## 2020-03-12 VITALS
DIASTOLIC BLOOD PRESSURE: 80 MMHG | HEIGHT: 61 IN | WEIGHT: 154.1 LBS | TEMPERATURE: 98 F | SYSTOLIC BLOOD PRESSURE: 120 MMHG | HEART RATE: 67 BPM | OXYGEN SATURATION: 98 % | RESPIRATION RATE: 16 BRPM

## 2020-03-12 DIAGNOSIS — R92.0 MAMMOGRAPHIC MICROCALCIFICATION FOUND ON DIAGNOSTIC IMAGING OF BREAST: ICD-10-CM

## 2020-03-12 DIAGNOSIS — D48.61 NEOPLASM OF UNCERTAIN BEHAVIOR OF RIGHT BREAST: ICD-10-CM

## 2020-03-12 DIAGNOSIS — D49.3 NEOPLASM OF UNSPECIFIED BEHAVIOR OF BREAST: ICD-10-CM

## 2020-03-12 DIAGNOSIS — Z01.818 ENCOUNTER FOR OTHER PREPROCEDURAL EXAMINATION: ICD-10-CM

## 2020-03-12 DIAGNOSIS — Z98.890 OTHER SPECIFIED POSTPROCEDURAL STATES: Chronic | ICD-10-CM

## 2020-03-12 LAB
HCT VFR BLD CALC: 42.2 % — SIGNIFICANT CHANGE UP (ref 34.5–45)
HGB BLD-MCNC: 13.7 G/DL — SIGNIFICANT CHANGE UP (ref 11.5–15.5)
MCHC RBC-ENTMCNC: 29.2 PG — SIGNIFICANT CHANGE UP (ref 27–34)
MCHC RBC-ENTMCNC: 32.5 GM/DL — SIGNIFICANT CHANGE UP (ref 32–36)
MCV RBC AUTO: 90 FL — SIGNIFICANT CHANGE UP (ref 80–100)
NRBC # BLD: 0 /100 WBCS — SIGNIFICANT CHANGE UP (ref 0–0)
PLATELET # BLD AUTO: 262 K/UL — SIGNIFICANT CHANGE UP (ref 150–400)
RBC # BLD: 4.69 M/UL — SIGNIFICANT CHANGE UP (ref 3.8–5.2)
RBC # FLD: 12.7 % — SIGNIFICANT CHANGE UP (ref 10.3–14.5)
WBC # BLD: 6.41 K/UL — SIGNIFICANT CHANGE UP (ref 3.8–10.5)
WBC # FLD AUTO: 6.41 K/UL — SIGNIFICANT CHANGE UP (ref 3.8–10.5)

## 2020-03-12 PROCEDURE — 85027 COMPLETE CBC AUTOMATED: CPT

## 2020-03-12 PROCEDURE — G0463: CPT

## 2020-03-12 NOTE — H&P PST ADULT - GRAVIDA, OB PROFILE
----- Message from Kvng Hoffman MD sent at 12/29/2016  5:01 PM CST -----  Chest x-ray had demonstrated no acute disease. There are calcified granulomas consistent with granulomatous disease. We had drawn the test for tuberculosis yesterday, it is still pending. There is evidence of old healed the left rib fractures. Mild degenerative arthritis in the thoracic spine.   1

## 2020-03-12 NOTE — H&P PST ADULT - NSICDXPROBLEM_GEN_ALL_CORE_FT
PROBLEM DIAGNOSES  Problem: Neoplasm of right breast  Assessment and Plan: Right breast excisional biopsy post trey  reflector placement   Labs- CBC

## 2020-03-12 NOTE — H&P PST ADULT - HISTORY OF PRESENT ILLNESS
45 yo F with cystic breasts c/o abnormal mammogram in 2019-f/u mammogram/ right breast biopsy-scheduled for right breast excisional biopsy post APRYL  reflector placement on 03/19/2020

## 2020-03-16 ENCOUNTER — APPOINTMENT (OUTPATIENT)
Dept: MAMMOGRAPHY | Facility: CLINIC | Age: 45
End: 2020-03-16

## 2020-09-28 ENCOUNTER — APPOINTMENT (OUTPATIENT)
Dept: DISASTER EMERGENCY | Facility: CLINIC | Age: 45
End: 2020-09-28

## 2020-09-29 ENCOUNTER — OUTPATIENT (OUTPATIENT)
Dept: OUTPATIENT SERVICES | Facility: HOSPITAL | Age: 45
LOS: 1 days | End: 2020-09-29
Payer: COMMERCIAL

## 2020-09-29 ENCOUNTER — APPOINTMENT (OUTPATIENT)
Dept: MAMMOGRAPHY | Facility: IMAGING CENTER | Age: 45
End: 2020-09-29
Payer: COMMERCIAL

## 2020-09-29 VITALS
TEMPERATURE: 98 F | OXYGEN SATURATION: 99 % | SYSTOLIC BLOOD PRESSURE: 114 MMHG | HEIGHT: 62 IN | DIASTOLIC BLOOD PRESSURE: 79 MMHG | HEART RATE: 73 BPM | RESPIRATION RATE: 18 BRPM | WEIGHT: 149.91 LBS

## 2020-09-29 DIAGNOSIS — Z98.890 OTHER SPECIFIED POSTPROCEDURAL STATES: Chronic | ICD-10-CM

## 2020-09-29 DIAGNOSIS — Z01.818 ENCOUNTER FOR OTHER PREPROCEDURAL EXAMINATION: ICD-10-CM

## 2020-09-29 DIAGNOSIS — Z00.8 ENCOUNTER FOR OTHER GENERAL EXAMINATION: ICD-10-CM

## 2020-09-29 DIAGNOSIS — R92.0 MAMMOGRAPHIC MICROCALCIFICATION FOUND ON DIAGNOSTIC IMAGING OF BREAST: ICD-10-CM

## 2020-09-29 LAB — SARS-COV-2 N GENE NPH QL NAA+PROBE: NOT DETECTED

## 2020-09-29 PROCEDURE — G0463: CPT

## 2020-09-29 PROCEDURE — 85027 COMPLETE CBC AUTOMATED: CPT

## 2020-09-29 PROCEDURE — 19281 PERQ DEVICE BREAST 1ST IMAG: CPT | Mod: RT

## 2020-09-29 PROCEDURE — C1739: CPT

## 2020-09-29 PROCEDURE — 19281 PERQ DEVICE BREAST 1ST IMAG: CPT

## 2020-09-29 NOTE — H&P PST ADULT - NSICDXPROBLEM_GEN_ALL_CORE_FT
PROBLEM DIAGNOSES  Problem: Mammographic microcalcification found on diagnostic imaging of breast  Assessment and Plan: coming in for right breast excisional biopsy post trey  reflector placement

## 2020-09-29 NOTE — H&P PST ADULT - HISTORY OF PRESENT ILLNESS
45yr old female planning on pregnancy was found to   have a change in microcalcifications and dysplasia on  biopsy coming in for right breast excisional biopsy  post trey  reflector placement.    Note covid test neg in HIE 9/28/20

## 2020-09-29 NOTE — H&P PST ADULT - WEIGHT IN LBS
DR. ABARCA'S POSTOPERATIVE INSTRUCTIONS:    1.  Activity: You should walk frequently to prevent postoperative problems and to help healing.  You may go up and down stairs without restriction.  You should not lift more than 10 pounds for 4 weeks after surgery.  You should do no straining.  You may drive a car when your pain is gone completely and you are no longer taking any prescription narcotic pain medication.    2. You may eat whatever you wish.    3. Resume your usual medications at home  . that Oral Contraceptive Pills may be ineffective for the next 8 days following anesthesia due to interactions with medications they may have received    4.  You will be given a prescription for narcotic pain medication.  You may take this as needed, but it is better not to take narcotic pain medication if possible.  You may take Tylenol instead if you wish.    5.  Wound Care:  Keep dressings clean and dry. Okay to shower and shower right over the surgical glue covering your incisions. ( 48 hours)     6.  It is not uncommon to have constipation after surgery, especially if you take the narcotic pain medication prescribed for you.  If you are taking narcotic pain medication take an over the counter stool softener daily.    7.  Call Dr. Abarca's office ([phone]) with any concerns or if:   · You have symptoms that are not \"normal\" for you.  · You have new or worse symptoms or pain, not relieved by medicine or  rest.  · Temperature greater than 101 degrees F, chills or flu like symptoms.  · Increased swelling, redness or drainage.      
149.9

## 2020-09-30 ENCOUNTER — TRANSCRIPTION ENCOUNTER (OUTPATIENT)
Age: 45
End: 2020-09-30

## 2020-10-01 ENCOUNTER — OUTPATIENT (OUTPATIENT)
Dept: OUTPATIENT SERVICES | Facility: HOSPITAL | Age: 45
LOS: 1 days | End: 2020-10-01
Payer: COMMERCIAL

## 2020-10-01 ENCOUNTER — RESULT REVIEW (OUTPATIENT)
Age: 45
End: 2020-10-01

## 2020-10-01 VITALS
HEART RATE: 55 BPM | SYSTOLIC BLOOD PRESSURE: 105 MMHG | TEMPERATURE: 98 F | WEIGHT: 149.91 LBS | RESPIRATION RATE: 16 BRPM | HEIGHT: 62 IN | OXYGEN SATURATION: 99 % | DIASTOLIC BLOOD PRESSURE: 69 MMHG

## 2020-10-01 VITALS
RESPIRATION RATE: 18 BRPM | DIASTOLIC BLOOD PRESSURE: 62 MMHG | SYSTOLIC BLOOD PRESSURE: 107 MMHG | OXYGEN SATURATION: 100 % | HEART RATE: 61 BPM

## 2020-10-01 DIAGNOSIS — Z98.890 OTHER SPECIFIED POSTPROCEDURAL STATES: Chronic | ICD-10-CM

## 2020-10-01 DIAGNOSIS — R92.0 MAMMOGRAPHIC MICROCALCIFICATION FOUND ON DIAGNOSTIC IMAGING OF BREAST: ICD-10-CM

## 2020-10-01 DIAGNOSIS — Z01.818 ENCOUNTER FOR OTHER PREPROCEDURAL EXAMINATION: ICD-10-CM

## 2020-10-01 PROCEDURE — 76098 X-RAY EXAM SURGICAL SPECIMEN: CPT | Mod: 26

## 2020-10-01 PROCEDURE — 88307 TISSUE EXAM BY PATHOLOGIST: CPT | Mod: 26

## 2020-10-01 PROCEDURE — 19301 PARTIAL MASTECTOMY: CPT | Mod: RT

## 2020-10-01 PROCEDURE — 88307 TISSUE EXAM BY PATHOLOGIST: CPT

## 2020-10-01 PROCEDURE — 76098 X-RAY EXAM SURGICAL SPECIMEN: CPT

## 2020-10-01 RX ORDER — OXYCODONE HYDROCHLORIDE 5 MG/1
5 TABLET ORAL ONCE
Refills: 0 | Status: DISCONTINUED | OUTPATIENT
Start: 2020-10-01 | End: 2020-10-01

## 2020-10-01 RX ORDER — ACETAMINOPHEN 500 MG
1000 TABLET ORAL ONCE
Refills: 0 | Status: DISCONTINUED | OUTPATIENT
Start: 2020-10-01 | End: 2020-10-15

## 2020-10-01 NOTE — BRIEF OPERATIVE NOTE - NSICDXBRIEFPROCEDURE_GEN_ALL_CORE_FT
PROCEDURES:  Excisional biopsy, breast 01-Oct-2020 08:29:53 Right breast, post Ignacia  reflector placement Yenny Cartagena

## 2020-10-01 NOTE — ASU DISCHARGE PLAN (ADULT/PEDIATRIC) - CARE PROVIDER_API CALL
Erin King  SURGERY  [facility], [address]  [place] [address]  Phone: ([phone]  Fax: ()-  Follow Up Time: 1 week

## 2020-10-01 NOTE — ASU DISCHARGE PLAN (ADULT/PEDIATRIC) - CALL YOUR DOCTOR IF YOU HAVE ANY OF THE FOLLOWING:
Pain not relieved by Medications/100.4F/Swelling that gets worse/Inability to tolerate liquids or foods/Bleeding that does not stop/Fever greater than (need to indicate Fahrenheit or Celsius)/Wound/Surgical Site with redness, or foul smelling discharge or pus

## 2020-10-01 NOTE — ASU DISCHARGE PLAN (ADULT/PEDIATRIC) - ASU DC SPECIAL INSTRUCTIONSFT
Please do not remove breast dressing/bra/binder for the next 4 days. After 4 days, please wear the bra and binder twice a day.     Please take pain medications as prescribed.    Follow-up with Dr. King in 1 week.

## 2020-10-06 LAB — SURGICAL PATHOLOGY STUDY: SIGNIFICANT CHANGE UP

## 2020-12-01 NOTE — PATIENT PROFILE ADULT. - PRO SERVICES AT DISCH
Pt is alert and oriented. VSS. RA. Pt c/o 9/10 chronic pain. Pt given PRN pain medication per MAR. Shift assessment completed and documented. Call light within reach. Bed side table within reach. Wheels locked. Bed in lowest position. Pt instructed to call out for assistance. Pt expressesed understanding & calls out appropritately. All care per orders. Will continue to monitor.  Electronically signed by Tish Carrera RN on 12/1/2020 at 12:52 AM unsure

## 2021-01-11 NOTE — H&P PST ADULT - DENTITION
Body Location Override (Optional - Billing Will Still Be Based On Selected Body Map Location If Applicable): Right inferior neck Anesthesia Volume In Cc: 6 Did You Provide Opioid Counseling: No normal Repair Type: Complex Repair Suturegard Retention Suture: 5-0 Nylon Retention Suture Bite Size: 3 mm Length To Time In Minutes Device Was In Place: 10 Hemigard Retention Suture: 2-0 Nylon Number Of Hemigard Strips Per Side: 1 Simple / Intermediate / Complex Repair - Final Wound Length In Cm: 5.6 Undermining Type: Entire Wound Debridement Text: The wound edges were debrided prior to proceeding with the closure to facilitate wound healing. Helical Rim Text: The closure involved the helical rim. Vermilion Border Text: The closure involved the vermilion border. Nostril Rim Text: The closure involved the nostril rim. Retention Suture Text: Retention sutures were placed to support the closure and prevent dehiscence. Primary Defect Length (In Cm): 0 Skin Substitute: EpiFix Micronized Suture Removal: 14 days Detail Level: Detailed Anesthesia Type: 1% lidocaine with epinephrine Hemostasis: Electrocautery Estimated Blood Loss (Cc): minimal Brow Lift Text: A midfrontal incision was made medially to the defect to allow access to the tissues just superior to the left eyebrow. Following careful dissection inferiorly in a supraperiosteal plane to the level of the left eyebrow, several 3-0 monocryl sutures were used to resuspend the eyebrow orbicularis oculi muscular unit to the superior frontal bone periosteum. This resulted in an appropriate reapproximation of static eyebrow symmetry and correction of the left brow ptosis. Deep Sutures: 3-0 Polysorb Epidermal Sutures: 4-0 Nylon Epidermal Closure: running Wound Care: Bacitracin Dressing: telfa dressing Suturegard Intro: Intraoperative tissue expansion was performed, utilizing the SUTUREGARD device, in order to reduce wound tension. Suturegard Body: The suture ends were repeatedly re-tightened and re-clamped to achieve the desired tissue expansion. Hemigard Intro: Due to skin fragility and wound tension, it was decided to use HEMIGARD adhesive retention suture devices to permit a linear closure. The skin was cleaned and dried for a 6cm distance away from the wound. Excessive hair, if present, was removed to allow for adhesion. Hemigard Postcare Instructions: The HEMIGARD strips are to remain completely dry for at least 5-7 days. Epidermal Closure Graft Donor Site (Optional): simple interrupted Graft Donor Site Bandage (Optional-Leave Blank If You Don't Want In Note): Steri-strips and a pressure bandage were applied to the donor site. Closure 2 Information: This tab is for additional flaps and grafts, including complex repair and grafts and complex repair and flaps. You can also specify a different location for the additional defect, if the location is the same you do not need to select a new one. We will insert the automated text for the repair you select below just as we do for solitary flaps and grafts. Please note that at this time if you select a location with a different insurance zone you will need to override the ICD10 and CPT if appropriate. Closure 3 Information: This tab is for additional flaps and grafts above and beyond our usual structured repairs. Please note if you enter information here it will not currently bill and you will need to add the billing information manually. Closure 4 Information: This tab is for additional flaps and grafts above and beyond our usual structured repairs.  Please note if you enter information here it will not currently bill and you will need to add the billing information manually. Complex Repair Preamble Text (Leave Blank If You Do Not Want): Extensive wide undermining was performed. Intermediate Repair Preamble Text (Leave Blank If You Do Not Want): Undermining was performed with blunt dissection. Non-Graft Cartilage Fenestration Text: The cartilage was fenestrated with a 2mm punch biopsy to help facilitate healing. Graft Cartilage Fenestration Text: The cartilage was fenestrated with a 2mm punch biopsy to help facilitate graft survival and healing. Preparation Of Recipient Site - Flap: The eschar was removed surgically with sharp dissection to facilitate appropriate wound healing of the following adjacent tissue rearrangement. Preparation Of Recipient Site - Graft: The eschar was removed surgically with sharp dissection to facilitate appropriate survival of the following graft. Preparation Of Recipient Site - Flap Takedown: The eschar and granulation tissue was removed surgically with sharp dissection to facilitate appropriate healing after division and inset of the proximal and distal interpolation flap. Secondary Intention Text (Leave Blank If You Do Not Want): The defect will heal with secondary intention. No Repair - Repaired With Adjacent Surgical Defect Text (Leave Blank If You Do Not Want): After obtaining clear surgical margins the defect was repaired concurrently with another surgical defect which was in close approximation. Referred To Oculoplastics For Closure Text (Leave Blank If You Do Not Want): After obtaining clear surgical margins the patient was sent to oculoplastics for surgical repair. The patient understands they will receive post-surgical care and follow-up from the referring physician's office. Referred To Otolaryngology For Closure Text (Leave Blank If You Do Not Want): After obtaining clear surgical margins the patient was sent to otolaryngology for surgical repair. The patient understands they will receive post-surgical care and follow-up from the referring physician's office. Referred To Plastics For Closure Text (Leave Blank If You Do Not Want): After obtaining clear surgical margins the patient was sent to plastics for surgical repair. The patient understands they will receive post-surgical care and follow-up from the referring physician's office. Referred To Asc For Closure Text (Leave Blank If You Do Not Want): After obtaining clear surgical margins the patient was sent to an [facility] for surgical repair. The patient understands they will receive post-surgical care and follow-up from the [facility] physician. Referred To Mid-Level For Closure Text (Leave Blank If You Do Not Want): After obtaining clear surgical margins the patient was sent to a mid-level provider for surgical repair. The patient understands they will receive post-surgical care and follow-up from the mid-level provider. Repair Performed By Another Provider Text (Leave Blank If You Do Not Want): After obtaining clear surgical margins the defect was repaired by another provider. Advancement Flap (Single) Text: The defect edges were debeveled with a #15 scalpel blade. Given the location of the defect and the proximity to free margins a single advancement flap was deemed most appropriate. Using a sterile surgical marker, an appropriate advancement flap was drawn incorporating the defect and placing the expected incisions within the relaxed skin tension lines where possible. The area thus outlined was incised deep to adipose tissue with a #15 scalpel blade. The skin margins were undermined to an appropriate distance in all directions utilizing iris scissors. Advancement Flap (Double) Text: The defect edges were debeveled with a #15 scalpel blade. Given the location of the defect and the proximity to free margins a double advancement flap was deemed most appropriate. Using a sterile surgical marker, the appropriate advancement flaps were drawn incorporating the defect and placing the expected incisions within the relaxed skin tension lines where possible. The area thus outlined was incised deep to adipose tissue with a #15 scalpel blade. The skin margins were undermined to an appropriate distance in all directions utilizing iris scissors. Burow's Advancement Flap Text: The defect edges were debeveled with a #15 scalpel blade. Given the location of the defect and the proximity to free margins a Burow's advancement flap was deemed most appropriate. Using a sterile surgical marker, the appropriate advancement flap was drawn incorporating the defect and placing the expected incisions within the relaxed skin tension lines where possible. The area thus outlined was incised deep to adipose tissue with a #15 scalpel blade. The skin margins were undermined to an appropriate distance in all directions utilizing iris scissors. Chonodrocutaneous Helical Advancement Flap Text: The defect edges were debeveled with a #15 scalpel blade. Given the location of the defect and the proximity to free margins a chondrocutaneous helical advancement flap was deemed most appropriate. Using a sterile surgical marker, the appropriate advancement flap was drawn incorporating the defect and placing the expected incisions within the relaxed skin tension lines where possible. The area thus outlined was incised deep to adipose tissue with a #15 scalpel blade. The skin margins were undermined to an appropriate distance in all directions utilizing iris scissors. Crescentic Advancement Flap Text: The defect edges were debeveled with a #15 scalpel blade. Given the location of the defect and the proximity to free margins a crescentic advancement flap was deemed most appropriate. Using a sterile surgical marker, the appropriate advancement flap was drawn incorporating the defect and placing the expected incisions within the relaxed skin tension lines where possible. The area thus outlined was incised deep to adipose tissue with a #15 scalpel blade. The skin margins were undermined to an appropriate distance in all directions utilizing iris scissors. A-T Advancement Flap Text: The defect edges were debeveled with a #15 scalpel blade. Given the location of the defect, shape of the defect and the proximity to free margins an A-T advancement flap was deemed most appropriate. Using a sterile surgical marker, an appropriate advancement flap was drawn incorporating the defect and placing the expected incisions within the relaxed skin tension lines where possible. The area thus outlined was incised deep to adipose tissue with a #15 scalpel blade. The skin margins were undermined to an appropriate distance in all directions utilizing iris scissors. O-T Advancement Flap Text: The defect edges were debeveled with a #15 scalpel blade. Given the location of the defect, shape of the defect and the proximity to free margins an O-T advancement flap was deemed most appropriate. Using a sterile surgical marker, an appropriate advancement flap was drawn incorporating the defect and placing the expected incisions within the relaxed skin tension lines where possible. The area thus outlined was incised deep to adipose tissue with a #15 scalpel blade. The skin margins were undermined to an appropriate distance in all directions utilizing iris scissors. O-L Flap Text: The defect edges were debeveled with a #15 scalpel blade. Given the location of the defect, shape of the defect and the proximity to free margins an O-L flap was deemed most appropriate. Using a sterile surgical marker, an appropriate advancement flap was drawn incorporating the defect and placing the expected incisions within the relaxed skin tension lines where possible. The area thus outlined was incised deep to adipose tissue with a #15 scalpel blade. The skin margins were undermined to an appropriate distance in all directions utilizing iris scissors. O-Z Flap Text: The defect edges were debeveled with a #15 scalpel blade. Given the location of the defect, shape of the defect and the proximity to free margins an O-Z flap was deemed most appropriate. Using a sterile surgical marker, an appropriate transposition flap was drawn incorporating the defect and placing the expected incisions within the relaxed skin tension lines where possible. The area thus outlined was incised deep to adipose tissue with a #15 scalpel blade. The skin margins were undermined to an appropriate distance in all directions utilizing iris scissors. Double O-Z Flap Text: The defect edges were debeveled with a #15 scalpel blade. Given the location of the defect, shape of the defect and the proximity to free margins a Double O-Z flap was deemed most appropriate. Using a sterile surgical marker, an appropriate transposition flap was drawn incorporating the defect and placing the expected incisions within the relaxed skin tension lines where possible. The area thus outlined was incised deep to adipose tissue with a #15 scalpel blade. The skin margins were undermined to an appropriate distance in all directions utilizing iris scissors. V-Y Flap Text: The defect edges were debeveled with a #15 scalpel blade. Given the location of the defect, shape of the defect and the proximity to free margins a V-Y flap was deemed most appropriate. Using a sterile surgical marker, an appropriate advancement flap was drawn incorporating the defect and placing the expected incisions within the relaxed skin tension lines where possible. The area thus outlined was incised deep to adipose tissue with a #15 scalpel blade. The skin margins were undermined to an appropriate distance in all directions utilizing iris scissors. Advancement-Rotation Flap Text: The defect edges were debeveled with a #15 scalpel blade. Given the location of the defect, shape of the defect and the proximity to free margins an advancement-rotation flap was deemed most appropriate. Using a sterile surgical marker, an appropriate flap was drawn incorporating the defect and placing the expected incisions within the relaxed skin tension lines where possible. The area thus outlined was incised deep to adipose tissue with a #15 scalpel blade. The skin margins were undermined to an appropriate distance in all directions utilizing iris scissors. Mercedes Flap Text: The defect edges were debeveled with a #15 scalpel blade. Given the location of the defect, shape of the defect and the proximity to free margins a Mercedes flap was deemed most appropriate. Using a sterile surgical marker, an appropriate advancement flap was drawn incorporating the defect and placing the expected incisions within the relaxed skin tension lines where possible. The area thus outlined was incised deep to adipose tissue with a #15 scalpel blade. The skin margins were undermined to an appropriate distance in all directions utilizing iris scissors. Modified Advancement Flap Text: The defect edges were debeveled with a #15 scalpel blade. Given the location of the defect, shape of the defect and the proximity to free margins a modified advancement flap was deemed most appropriate. Using a sterile surgical marker, an appropriate advancement flap was drawn incorporating the defect and placing the expected incisions within the relaxed skin tension lines where possible. The area thus outlined was incised deep to adipose tissue with a #15 scalpel blade. The skin margins were undermined to an appropriate distance in all directions utilizing iris scissors. Mucosal Advancement Flap Text: Given the location of the defect, shape of the defect and the proximity to free margins a mucosal advancement flap was deemed most appropriate. Incisions were made with a 15 blade scalpel in the appropriate fashion along the cutaneous vermilion border and the mucosal lip. The remaining actinically damaged mucosal tissue was excised. The mucosal advancement flap was then elevated to the gingival sulcus with care taken to preserve the neurovascular structures and advanced into the primary defect. Care was taken to ensure that precise realignment of the vermilion border was achieved. Peng Advancement Flap Text: The defect edges were debeveled with a #15 scalpel blade. Given the location of the defect, shape of the defect and the proximity to free margins a Peng advancement flap was deemed most appropriate. Using a sterile surgical marker, an appropriate advancement flap was drawn incorporating the defect and placing the expected incisions within the relaxed skin tension lines where possible. The area thus outlined was incised deep to adipose tissue with a #15 scalpel blade. The skin margins were undermined to an appropriate distance in all directions utilizing iris scissors. Hatchet Flap Text: The defect edges were debeveled with a #15 scalpel blade. Given the location of the defect, shape of the defect and the proximity to free margins a hatchet flap was deemed most appropriate. Using a sterile surgical marker, an appropriate hatchet flap was drawn incorporating the defect and placing the expected incisions within the relaxed skin tension lines where possible. The area thus outlined was incised deep to adipose tissue with a #15 scalpel blade. The skin margins were undermined to an appropriate distance in all directions utilizing iris scissors. Rotation Flap Text: The defect edges were debeveled with a #15 scalpel blade. Given the location of the defect, shape of the defect and the proximity to free margins a rotation flap was deemed most appropriate. Using a sterile surgical marker, an appropriate rotation flap was drawn incorporating the defect and placing the expected incisions within the relaxed skin tension lines where possible. The area thus outlined was incised deep to adipose tissue with a #15 scalpel blade. The skin margins were undermined to an appropriate distance in all directions utilizing iris scissors. Spiral Flap Text: The defect edges were debeveled with a #15 scalpel blade. Given the location of the defect, shape of the defect and the proximity to free margins a spiral flap was deemed most appropriate. Using a sterile surgical marker, an appropriate rotation flap was drawn incorporating the defect and placing the expected incisions within the relaxed skin tension lines where possible. The area thus outlined was incised deep to adipose tissue with a #15 scalpel blade. The skin margins were undermined to an appropriate distance in all directions utilizing iris scissors. Star Wedge Flap Text: The defect edges were debeveled with a #15 scalpel blade. Given the location of the defect, shape of the defect and the proximity to free margins a star wedge flap was deemed most appropriate. Using a sterile surgical marker, an appropriate rotation flap was drawn incorporating the defect and placing the expected incisions within the relaxed skin tension lines where possible. The area thus outlined was incised deep to adipose tissue with a #15 scalpel blade. The skin margins were undermined to an appropriate distance in all directions utilizing iris scissors. Transposition Flap Text: The defect edges were debeveled with a #15 scalpel blade. Given the location of the defect and the proximity to free margins a transposition flap was deemed most appropriate. Using a sterile surgical marker, an appropriate transposition flap was drawn incorporating the defect. The area thus outlined was incised deep to adipose tissue with a #15 scalpel blade. The skin margins were undermined to an appropriate distance in all directions utilizing iris scissors. Muscle Hinge Flap Text: The defect edges were debeveled with a #15 scalpel blade. Given the size, depth and location of the defect and the proximity to free margins a muscle hinge flap was deemed most appropriate. Using a sterile surgical marker, an appropriate hinge flap was drawn incorporating the defect. The area thus outlined was incised with a #15 scalpel blade. The skin margins were undermined to an appropriate distance in all directions utilizing iris scissors. Nasal Turnover Hinge Flap Text: The defect edges were debeveled with a #15 scalpel blade. Given the size, depth, location of the defect and the defect being full thickness a nasal turnover hinge flap was deemed most appropriate. Using a sterile surgical marker, an appropriate hinge flap was drawn incorporating the defect. The area thus outlined was incised with a #15 scalpel blade. The flap was designed to recreate the nasal mucosal lining and the alar rim. The skin margins were undermined to an appropriate distance in all directions utilizing iris scissors. Nasalis-Muscle-Based Myocutaneous Island Pedicle Flap Text: Using a #15 blade, an incision was made around the donor flap to the level of the nasalis muscle. Wide lateral undermining was then performed in both the subcutaneous plane above the nasalis muscle, and in a submuscular plane just above periosteum. This allowed the formation of a free nasalis muscle axial pedicle (based on the angular artery) which was still attached to the actual cutaneous flap, increasing its mobility and vascular viability. Hemostasis was obtained with pinpoint electrocoagulation. The flap was mobilized into position and the pivotal anchor points positioned and stabilized with buried interrupted sutures. Subcutaneous and dermal tissues were closed in a multilayered fashion with sutures. Tissue redundancies were excised, and the epidermal edges were apposed without significant tension and sutured with sutures. Orbicularis Oris Muscle Flap Text: The defect edges were debeveled with a #15 scalpel blade. Given that the defect affected the competency of the oral sphincter an obicularis oris muscle flap was deemed most appropriate to restore this competency and normal muscle function. Using a sterile surgical marker, an appropriate flap was drawn incorporating the defect. The area thus outlined was incised with a #15 scalpel blade. Melolabial Transposition Flap Text: The defect edges were debeveled with a #15 scalpel blade. Given the location of the defect and the proximity to free margins a melolabial flap was deemed most appropriate. Using a sterile surgical marker, an appropriate melolabial transposition flap was drawn incorporating the defect. The area thus outlined was incised deep to adipose tissue with a #15 scalpel blade. The skin margins were undermined to an appropriate distance in all directions utilizing iris scissors. Rhombic Flap Text: The defect edges were debeveled with a #15 scalpel blade. Given the location of the defect and the proximity to free margins a rhombic flap was deemed most appropriate. Using a sterile surgical marker, an appropriate rhombic flap was drawn incorporating the defect. The area thus outlined was incised deep to adipose tissue with a #15 scalpel blade. The skin margins were undermined to an appropriate distance in all directions utilizing iris scissors. Rhomboid Transposition Flap Text: The defect edges were debeveled with a #15 scalpel blade. Given the location of the defect and the proximity to free margins a rhomboid transposition flap was deemed most appropriate. Using a sterile surgical marker, an appropriate rhomboid flap was drawn incorporating the defect. The area thus outlined was incised deep to adipose tissue with a #15 scalpel blade. The skin margins were undermined to an appropriate distance in all directions utilizing iris scissors. Bi-Rhombic Flap Text: The defect edges were debeveled with a #15 scalpel blade. Given the location of the defect and the proximity to free margins a bi-rhombic flap was deemed most appropriate. Using a sterile surgical marker, an appropriate rhombic flap was drawn incorporating the defect. The area thus outlined was incised deep to adipose tissue with a #15 scalpel blade. The skin margins were undermined to an appropriate distance in all directions utilizing iris scissors. Helical Rim Advancement Flap Text: The defect edges were debeveled with a #15 blade scalpel. Given the location of the defect and the proximity to free margins (helical rim) a double helical rim advancement flap was deemed most appropriate. Using a sterile surgical marker, the appropriate advancement flaps were drawn incorporating the defect and placing the expected incisions between the helical rim and antihelix where possible. The area thus outlined was incised through and through with a #15 scalpel blade. With a skin hook and iris scissors, the flaps were gently and sharply undermined and freed up. Bilateral Helical Rim Advancement Flap Text: The defect edges were debeveled with a #15 blade scalpel. Given the location of the defect and the proximity to free margins (helical rim) a bilateral helical rim advancement flap was deemed most appropriate. Using a sterile surgical marker, the appropriate advancement flaps were drawn incorporating the defect and placing the expected incisions between the helical rim and antihelix where possible. The area thus outlined was incised through and through with a #15 scalpel blade. With a skin hook and iris scissors, the flaps were gently and sharply undermined and freed up. Ear Star Wedge Flap Text: The defect edges were debeveled with a #15 blade scalpel. Given the location of the defect and the proximity to free margins (helical rim) an ear star wedge flap was deemed most appropriate. Using a sterile surgical marker, the appropriate flap was drawn incorporating the defect and placing the expected incisions between the helical rim and antihelix where possible. The area thus outlined was incised through and through with a #15 scalpel blade. Banner Transposition Flap Text: The defect edges were debeveled with a #15 scalpel blade. Given the location of the defect and the proximity to free margins a Banner transposition flap was deemed most appropriate. Using a sterile surgical marker, an appropriate flap drawn around the defect. The area thus outlined was incised deep to adipose tissue with a #15 scalpel blade. The skin margins were undermined to an appropriate distance in all directions utilizing iris scissors. Bilobed Flap Text: The defect edges were debeveled with a #15 scalpel blade. Given the location of the defect and the proximity to free margins a bilobe flap was deemed most appropriate. Using a sterile surgical marker, an appropriate bilobe flap drawn around the defect. The area thus outlined was incised deep to adipose tissue with a #15 scalpel blade. The skin margins were undermined to an appropriate distance in all directions utilizing iris scissors. Bilobed Transposition Flap Text: The defect edges were debeveled with a #15 scalpel blade. Given the location of the defect and the proximity to free margins a bilobed transposition flap was deemed most appropriate. Using a sterile surgical marker, an appropriate bilobe flap drawn around the defect. The area thus outlined was incised deep to adipose tissue with a #15 scalpel blade. The skin margins were undermined to an appropriate distance in all directions utilizing iris scissors. Trilobed Flap Text: The defect edges were debeveled with a #15 scalpel blade. Given the location of the defect and the proximity to free margins a trilobed flap was deemed most appropriate. Using a sterile surgical marker, an appropriate trilobed flap drawn around the defect. The area thus outlined was incised deep to adipose tissue with a #15 scalpel blade. The skin margins were undermined to an appropriate distance in all directions utilizing iris scissors. Dorsal Nasal Flap Text: The defect edges were debeveled with a #15 scalpel blade. Given the location of the defect and the proximity to free margins a dorsal nasal flap was deemed most appropriate. Using a sterile surgical marker, an appropriate dorsal nasal flap was drawn around the defect. The area thus outlined was incised deep to adipose tissue with a #15 scalpel blade. The skin margins were undermined to an appropriate distance in all directions utilizing iris scissors. Island Pedicle Flap Text: The defect edges were debeveled with a #15 scalpel blade. Given the location of the defect, shape of the defect and the proximity to free margins an island pedicle advancement flap was deemed most appropriate. Using a sterile surgical marker, an appropriate advancement flap was drawn incorporating the defect, outlining the appropriate donor tissue and placing the expected incisions within the relaxed skin tension lines where possible. The area thus outlined was incised deep to adipose tissue with a #15 scalpel blade. The skin margins were undermined to an appropriate distance in all directions around the primary defect and laterally outward around the island pedicle utilizing iris scissors. There was minimal undermining beneath the pedicle flap. Island Pedicle Flap With Canthal Suspension Text: The defect edges were debeveled with a #15 scalpel blade. Given the location of the defect, shape of the defect and the proximity to free margins an island pedicle advancement flap was deemed most appropriate. Using a sterile surgical marker, an appropriate advancement flap was drawn incorporating the defect, outlining the appropriate donor tissue and placing the expected incisions within the relaxed skin tension lines where possible. The area thus outlined was incised deep to adipose tissue with a #15 scalpel blade. The skin margins were undermined to an appropriate distance in all directions around the primary defect and laterally outward around the island pedicle utilizing iris scissors. There was minimal undermining beneath the pedicle flap. A suspension suture was placed in the canthal tendon to prevent tension and prevent ectropion. Alar Island Pedicle Flap Text: The defect edges were debeveled with a #15 scalpel blade. Given the location of the defect, shape of the defect and the proximity to the alar rim an island pedicle advancement flap was deemed most appropriate. Using a sterile surgical marker, an appropriate advancement flap was drawn incorporating the defect, outlining the appropriate donor tissue and placing the expected incisions within the nasal ala running parallel to the alar rim. The area thus outlined was incised with a #15 scalpel blade. The skin margins were undermined minimally to an appropriate distance in all directions around the primary defect and laterally outward around the island pedicle utilizing iris scissors. There was minimal undermining beneath the pedicle flap. Double Island Pedicle Flap Text: The defect edges were debeveled with a #15 scalpel blade. Given the location of the defect, shape of the defect and the proximity to free margins a double island pedicle advancement flap was deemed most appropriate. Using a sterile surgical marker, an appropriate advancement flap was drawn incorporating the defect, outlining the appropriate donor tissue and placing the expected incisions within the relaxed skin tension lines where possible. The area thus outlined was incised deep to adipose tissue with a #15 scalpel blade. The skin margins were undermined to an appropriate distance in all directions around the primary defect and laterally outward around the island pedicle utilizing iris scissors. There was minimal undermining beneath the pedicle flap. Island Pedicle Flap-Requiring Vessel Identification Text: The defect edges were debeveled with a #15 scalpel blade. Given the location of the defect, shape of the defect and the proximity to free margins an island pedicle advancement flap was deemed most appropriate. Using a sterile surgical marker, an appropriate advancement flap was drawn, based on the axial vessel mentioned above, incorporating the defect, outlining the appropriate donor tissue and placing the expected incisions within the relaxed skin tension lines where possible. The area thus outlined was incised deep to adipose tissue with a #15 scalpel blade. The skin margins were undermined to an appropriate distance in all directions around the primary defect and laterally outward around the island pedicle utilizing iris scissors. There was minimal undermining beneath the pedicle flap. Keystone Flap Text: The defect edges were debeveled with a #15 scalpel blade. Given the location of the defect, shape of the defect a keystone flap was deemed most appropriate. Using a sterile surgical marker, an appropriate keystone flap was drawn incorporating the defect, outlining the appropriate donor tissue and placing the expected incisions within the relaxed skin tension lines where possible. The area thus outlined was incised deep to adipose tissue with a #15 scalpel blade. The skin margins were undermined to an appropriate distance in all directions around the primary defect and laterally outward around the flap utilizing iris scissors. O-T Plasty Text: The defect edges were debeveled with a #15 scalpel blade. Given the location of the defect, shape of the defect and the proximity to free margins an O-T plasty was deemed most appropriate. Using a sterile surgical marker, an appropriate O-T plasty was drawn incorporating the defect and placing the expected incisions within the relaxed skin tension lines where possible. The area thus outlined was incised deep to adipose tissue with a #15 scalpel blade. The skin margins were undermined to an appropriate distance in all directions utilizing iris scissors. O-Z Plasty Text: The defect edges were debeveled with a #15 scalpel blade. Given the location of the defect, shape of the defect and the proximity to free margins an O-Z plasty (double transposition flap) was deemed most appropriate. Using a sterile surgical marker, the appropriate transposition flaps were drawn incorporating the defect and placing the expected incisions within the relaxed skin tension lines where possible. The area thus outlined was incised deep to adipose tissue with a #15 scalpel blade. The skin margins were undermined to an appropriate distance in all directions utilizing iris scissors. Hemostasis was achieved with electrocautery. The flaps were then transposed into place, one clockwise and the other counterclockwise, and anchored with interrupted buried subcutaneous sutures. Double O-Z Plasty Text: The defect edges were debeveled with a #15 scalpel blade. Given the location of the defect, shape of the defect and the proximity to free margins a Double O-Z plasty (double transposition flap) was deemed most appropriate. Using a sterile surgical marker, the appropriate transposition flaps were drawn incorporating the defect and placing the expected incisions within the relaxed skin tension lines where possible. The area thus outlined was incised deep to adipose tissue with a #15 scalpel blade. The skin margins were undermined to an appropriate distance in all directions utilizing iris scissors. Hemostasis was achieved with electrocautery. The flaps were then transposed into place, one clockwise and the other counterclockwise, and anchored with interrupted buried subcutaneous sutures. V-Y Plasty Text: The defect edges were debeveled with a #15 scalpel blade. Given the location of the defect, shape of the defect and the proximity to free margins an V-Y advancement flap was deemed most appropriate. Using a sterile surgical marker, an appropriate advancement flap was drawn incorporating the defect and placing the expected incisions within the relaxed skin tension lines where possible. The area thus outlined was incised deep to adipose tissue with a #15 scalpel blade. The skin margins were undermined to an appropriate distance in all directions utilizing iris scissors. H Plasty Text: Given the location of the defect, shape of the defect and the proximity to free margins a H-plasty was deemed most appropriate for repair. Using a sterile surgical marker, the appropriate advancement arms of the H-plasty were drawn incorporating the defect and placing the expected incisions within the relaxed skin tension lines where possible. The area thus outlined was incised deep to adipose tissue with a #15 scalpel blade. The skin margins were undermined to an appropriate distance in all directions utilizing iris scissors. The opposing advancement arms were then advanced into place in opposite direction and anchored with interrupted buried subcutaneous sutures. W Plasty Text: The lesion was extirpated to the level of the fat with a #15 scalpel blade. Given the location of the defect, shape of the defect and the proximity to free margins a W-plasty was deemed most appropriate for repair. Using a sterile surgical marker, the appropriate transposition arms of the W-plasty were drawn incorporating the defect and placing the expected incisions within the relaxed skin tension lines where possible. The area thus outlined was incised deep to adipose tissue with a #15 scalpel blade. The skin margins were undermined to an appropriate distance in all directions utilizing iris scissors. The opposing transposition arms were then transposed into place in opposite direction and anchored with interrupted buried subcutaneous sutures. Z Plasty Text: The lesion was extirpated to the level of the fat with a #15 scalpel blade. Given the location of the defect, shape of the defect and the proximity to free margins a Z-plasty was deemed most appropriate for repair. Using a sterile surgical marker, the appropriate transposition arms of the Z-plasty were drawn incorporating the defect and placing the expected incisions within the relaxed skin tension lines where possible. The area thus outlined was incised deep to adipose tissue with a #15 scalpel blade. The skin margins were undermined to an appropriate distance in all directions utilizing iris scissors. The opposing transposition arms were then transposed into place in opposite direction and anchored with interrupted buried subcutaneous sutures. Zygomaticofacial Flap Text: Given the location of the defect, shape of the defect and the proximity to free margins a zygomaticofacial flap was deemed most appropriate for repair. Using a sterile surgical marker, the appropriate flap was drawn incorporating the defect and placing the expected incisions within the relaxed skin tension lines where possible. The area thus outlined was incised deep to adipose tissue with a #15 scalpel blade with preservation of a vascular pedicle. The skin margins were undermined to an appropriate distance in all directions utilizing iris scissors. The flap was then placed into the defect and anchored with interrupted buried subcutaneous sutures. Cheek Interpolation Flap Text: A decision was made to reconstruct the defect utilizing an interpolation axial flap and a staged reconstruction. A telfa template was made of the defect. This telfa template was then used to outline the Cheek Interpolation flap. The donor area for the pedicle flap was then injected with anesthesia. The flap was excised through the skin and subcutaneous tissue down to the layer of the underlying musculature. The interpolation flap was carefully excised within this deep plane to maintain its blood supply. The edges of the donor site were undermined. The donor site was closed in a primary fashion. The pedicle was then rotated into position and sutured. Once the tube was sutured into place, adequate blood supply was confirmed with blanching and refill. The pedicle was then wrapped with xeroform gauze and dressed appropriately with a telfa and gauze bandage to ensure continued blood supply and protect the attached pedicle. Cheek-To-Nose Interpolation Flap Text: A decision was made to reconstruct the defect utilizing an interpolation axial flap and a staged reconstruction. A telfa template was made of the defect. This telfa template was then used to outline the Cheek-To-Nose Interpolation flap. The donor area for the pedicle flap was then injected with anesthesia. The flap was excised through the skin and subcutaneous tissue down to the layer of the underlying musculature. The interpolation flap was carefully excised within this deep plane to maintain its blood supply. The edges of the donor site were undermined. The donor site was closed in a primary fashion. The pedicle was then rotated into position and sutured. Once the tube was sutured into place, adequate blood supply was confirmed with blanching and refill. The pedicle was then wrapped with xeroform gauze and dressed appropriately with a telfa and gauze bandage to ensure continued blood supply and protect the attached pedicle. Interpolation Flap Text: A decision was made to reconstruct the defect utilizing an interpolation axial flap and a staged reconstruction. A telfa template was made of the defect. This telfa template was then used to outline the interpolation flap. The donor area for the pedicle flap was then injected with anesthesia. The flap was excised through the skin and subcutaneous tissue down to the layer of the underlying musculature. The interpolation flap was carefully excised within this deep plane to maintain its blood supply. The edges of the donor site were undermined. The donor site was closed in a primary fashion. The pedicle was then rotated into position and sutured. Once the tube was sutured into place, adequate blood supply was confirmed with blanching and refill. The pedicle was then wrapped with xeroform gauze and dressed appropriately with a telfa and gauze bandage to ensure continued blood supply and protect the attached pedicle. Melolabial Interpolation Flap Text: A decision was made to reconstruct the defect utilizing an interpolation axial flap and a staged reconstruction. A telfa template was made of the defect. This telfa template was then used to outline the melolabial interpolation flap. The donor area for the pedicle flap was then injected with anesthesia. The flap was excised through the skin and subcutaneous tissue down to the layer of the underlying musculature. The pedicle flap was carefully excised within this deep plane to maintain its blood supply. The edges of the donor site were undermined. The donor site was closed in a primary fashion. The pedicle was then rotated into position and sutured. Once the tube was sutured into place, adequate blood supply was confirmed with blanching and refill. The pedicle was then wrapped with xeroform gauze and dressed appropriately with a telfa and gauze bandage to ensure continued blood supply and protect the attached pedicle. Mastoid Interpolation Flap Text: A decision was made to reconstruct the defect utilizing an interpolation axial flap and a staged reconstruction. A telfa template was made of the defect. This telfa template was then used to outline the mastoid interpolation flap. The donor area for the pedicle flap was then injected with anesthesia. The flap was excised through the skin and subcutaneous tissue down to the layer of the underlying musculature. The pedicle flap was carefully excised within this deep plane to maintain its blood supply. The edges of the donor site were undermined. The donor site was closed in a primary fashion. The pedicle was then rotated into position and sutured. Once the tube was sutured into place, adequate blood supply was confirmed with blanching and refill. The pedicle was then wrapped with xeroform gauze and dressed appropriately with a telfa and gauze bandage to ensure continued blood supply and protect the attached pedicle. Posterior Auricular Interpolation Flap Text: A decision was made to reconstruct the defect utilizing an interpolation axial flap and a staged reconstruction. A telfa template was made of the defect. This telfa template was then used to outline the posterior auricular interpolation flap. The donor area for the pedicle flap was then injected with anesthesia. The flap was excised through the skin and subcutaneous tissue down to the layer of the underlying musculature. The pedicle flap was carefully excised within this deep plane to maintain its blood supply. The edges of the donor site were undermined. The donor site was closed in a primary fashion. The pedicle was then rotated into position and sutured. Once the tube was sutured into place, adequate blood supply was confirmed with blanching and refill. The pedicle was then wrapped with xeroform gauze and dressed appropriately with a telfa and gauze bandage to ensure continued blood supply and protect the attached pedicle. Paramedian Forehead Flap Text: A decision was made to reconstruct the defect utilizing an interpolation axial flap and a staged reconstruction. A telfa template was made of the defect. This telfa template was then used to outline the paramedian forehead pedicle flap. The donor area for the pedicle flap was then injected with anesthesia. The flap was excised through the skin and subcutaneous tissue down to the layer of the underlying musculature. The pedicle flap was carefully excised within this deep plane to maintain its blood supply. The edges of the donor site were undermined. The donor site was closed in a primary fashion. The pedicle was then rotated into position and sutured. Once the tube was sutured into place, adequate blood supply was confirmed with blanching and refill. The pedicle was then wrapped with xeroform gauze and dressed appropriately with a telfa and gauze bandage to ensure continued blood supply and protect the attached pedicle. Cheiloplasty (Less Than 50%) Text: A decision was made to reconstruct the defect with a  cheiloplasty. The defect was undermined extensively. Additional obicularis oris muscle was excised with a 15 blade scalpel. The defect was converted into a full thickness wedge, of less than 50% of the vertical height of the lip, to facilite a better cosmetic result. Small vessels were then tied off with 5-0 monocyrl. The obicularis oris, superficial fascia, adipose and dermis were then reapproximated. After the deeper layers were approximated the epidermis was reapproximated with particular care given to realign the vermilion border. Cheiloplasty (Complex) Text: A decision was made to reconstruct the defect with a  cheiloplasty. The defect was undermined extensively. Additional obicularis oris muscle was excised with a 15 blade scalpel. The defect was converted into a full thickness wedge to facilite a better cosmetic result. Small vessels were then tied off with 5-0 monocyrl. The obicularis oris, superficial fascia, adipose and dermis were then reapproximated. After the deeper layers were approximated the epidermis was reapproximated with particular care given to realign the vermilion border. Ear Wedge Repair Text: A wedge excision was completed by carrying down an excision through the full thickness of the ear and cartilage with an inward facing Burow's triangle. The wound was then closed in a layered fashion. Full Thickness Lip Wedge Repair (Flap) Text: Given the location of the defect and the proximity to free margins a full thickness wedge repair was deemed most appropriate. Using a sterile surgical marker, the appropriate repair was drawn incorporating the defect and placing the expected incisions perpendicular to the vermilion border. The vermilion border was also meticulously outlined to ensure appropriate reapproximation during the repair. The area thus outlined was incised through and through with a #15 scalpel blade. The muscularis and dermis were reaproximated with deep sutures following hemostasis. Care was taken to realign the vermilion border before proceeding with the superficial closure. Once the vermilion was realigned the superfical and mucosal closure was finished. Ftsg Text: The defect edges were debeveled with a #15 scalpel blade. Given the location of the defect, shape of the defect and the proximity to free margins a full thickness skin graft was deemed most appropriate. Using a sterile surgical marker, the primary defect shape was transferred to the donor site. The area thus outlined was incised deep to adipose tissue with a #15 scalpel blade. The harvested graft was then trimmed of adipose tissue until only dermis and epidermis was left. The skin margins of the secondary defect were undermined to an appropriate distance in all directions utilizing iris scissors. The secondary defect was closed with interrupted buried subcutaneous sutures. The skin edges were then re-apposed with running  sutures. The skin graft was then placed in the primary defect and oriented appropriately. Split-Thickness Skin Graft Text: The defect edges were debeveled with a #15 scalpel blade. Given the location of the defect, shape of the defect and the proximity to free margins a split thickness skin graft was deemed most appropriate. Using a sterile surgical marker, the primary defect shape was transferred to the donor site. The split thickness graft was then harvested. The skin graft was then placed in the primary defect and oriented appropriately. Burow's Graft Text: The defect edges were debeveled with a #15 scalpel blade. Given the location of the defect, shape of the defect, the proximity to free margins and the presence of a standing cone deformity a Burow's skin graft was deemed most appropriate. The standing cone was removed and this tissue was then trimmed to the shape of the primary defect. The adipose tissue was also removed until only dermis and epidermis were left. The skin margins of the secondary defect were undermined to an appropriate distance in all directions utilizing iris scissors. The secondary defect was closed with interrupted buried subcutaneous sutures. The skin edges were then re-apposed with running  sutures. The skin graft was then placed in the primary defect and oriented appropriately. Cartilage Graft Text: The defect edges were debeveled with a #15 scalpel blade. Given the location of the defect, shape of the defect, the fact the defect involved a full thickness cartilage defect a cartilage graft was deemed most appropriate. An appropriate donor site was identified, cleansed, and anesthetized. The cartilage graft was then harvested and transferred to the recipient site, oriented appropriately and then sutured into place. The secondary defect was then repaired using a primary closure. Composite Graft Text: The defect edges were debeveled with a #15 scalpel blade. Given the location of the defect, shape of the defect, the proximity to free margins and the fact the defect was full thickness a composite graft was deemed most appropriate. The defect was outline and then transferred to the donor site. A full thickness graft was then excised from the donor site. The graft was then placed in the primary defect, oriented appropriately and then sutured into place. The secondary defect was then repaired using a primary closure. Epidermal Autograft Text: The defect edges were debeveled with a #15 scalpel blade. Given the location of the defect, shape of the defect and the proximity to free margins an epidermal autograft was deemed most appropriate. Using a sterile surgical marker, the primary defect shape was transferred to the donor site. The epidermal graft was then harvested. The skin graft was then placed in the primary defect and oriented appropriately. Dermal Autograft Text: The defect edges were debeveled with a #15 scalpel blade. Given the location of the defect, shape of the defect and the proximity to free margins a dermal autograft was deemed most appropriate. Using a sterile surgical marker, the primary defect shape was transferred to the donor site. The area thus outlined was incised deep to adipose tissue with a #15 scalpel blade. The harvested graft was then trimmed of adipose and epidermal tissue until only dermis was left. The skin graft was then placed in the primary defect and oriented appropriately. Skin Substitute Text: The defect edges were debeveled with a #15 scalpel blade. Given the location of the defect, shape of the defect and the proximity to free margins a skin substitute graft was deemed most appropriate. The graft material was trimmed to fit the size of the defect. The graft was then placed in the primary defect and oriented appropriately. Skin Substitute Paste Text: The defect edges were debeveled with a #15 scalpel blade. Given the location of the defect, shape of the defect and the proximity to free margins a skin substitute micronized graft was deemed most appropriate. The entire vial contents were admixed with 0.5ccs of sterile saline, formed into a paste and then evenly spread over the entire wound bed. Skin Substitute Injection Text: The defect edges were debeveled with a #15 scalpel blade. Given the location of the defect, shape of the defect and the proximity to free margins a skin substitute micronized graft was deemed most appropriate. The entire vial contents were admixed with 3.0ccs of sterile saline and then injected subcutaneously throughout the entire wound bed. Tissue Cultured Epidermal Autograft Text: The defect edges were debeveled with a #15 scalpel blade. Given the location of the defect, shape of the defect and the proximity to free margins a tissue cultured epidermal autograft was deemed most appropriate. The graft was then trimmed to fit the size of the defect. The graft was then placed in the primary defect and oriented appropriately. Xenograft Text: The defect edges were debeveled with a #15 scalpel blade. Given the location of the defect, shape of the defect and the proximity to free margins a xenograft was deemed most appropriate. The graft was then trimmed to fit the size of the defect. The graft was then placed in the primary defect and oriented appropriately. Purse String (Simple) Text: Given the location of the defect and the characteristics of the surrounding skin a purse string closure was deemed most appropriate. Undermining was performed circumfirentially around the surgical defect. A purse string suture was then placed and tightened. Purse String (Intermediate) Text: Given the location of the defect and the characteristics of the surrounding skin a purse string intermediate closure was deemed most appropriate. Undermining was performed circumfirentially around the surgical defect. A purse string suture was then placed and tightened. Partial Purse String (Simple) Text: Given the location of the defect and the characteristics of the surrounding skin a simple purse string closure was deemed most appropriate. Undermining was performed circumfirentially around the surgical defect. A purse string suture was then placed and tightened. Wound tension only allowed a partial closure of the circular defect. Partial Purse String (Intermediate) Text: Given the location of the defect and the characteristics of the surrounding skin an intermediate purse string closure was deemed most appropriate. Undermining was performed circumfirentially around the surgical defect. A purse string suture was then placed and tightened. Wound tension only allowed a partial closure of the circular defect. Localized Dermabrasion Text: The patient was draped in routine manner. Localized dermabrasion using 3 x 17 mm wire brush was performed in routine manner to papillary dermis. This spot dermabrasion is being performed to complete skin cancer reconstruction. It also will eliminate the other sun damaged precancerous cells that are known to be part of the regional effect of a lifetime's worth of sun exposure. This localized dermabrasion is therapeutic and should not be considered cosmetic in any regard. Tarsorrhaphy Text: A tarsorrhaphy was performed using Frost sutures. Complex Repair And Flap Additional Text (Will Appearing After The Standard Complex Repair Text): The complex repair was not sufficient to completely close the primary defect. The remaining additional defect was repaired with the flap mentioned below. Complex Repair And Graft Additional Text (Will Appearing After The Standard Complex Repair Text): The complex repair was not sufficient to completely close the primary defect. The remaining additional defect was repaired with the graft mentioned below. Cheek Interpolation Flap Division And Inset Text: Division and inset of the cheek interpolation flap was performed to achieve optimal aesthetic result, restore normal anatomic appearance and avoid distortion of normal anatomy, expedite and facilitate wound healing, achieve optimal functional result and because linear closure either not possible or would produce suboptimal result. The patient was prepped and draped in the usual manner. The pedicle was infiltrated with local anesthesia. The pedicle was sectioned with a #15 blade. The pedicle was de-bulked and trimmed to match the shape of the defect. Hemostasis was achieved. The flap donor site and free margin of the flap were secured with deep buried sutures and the wound edges were re-approximated. Cheek To Nose Interpolation Flap Division And Inset Text: Division and inset of the cheek to nose interpolation flap was performed to achieve optimal aesthetic result, restore normal anatomic appearance and avoid distortion of normal anatomy, expedite and facilitate wound healing, achieve optimal functional result and because linear closure either not possible or would produce suboptimal result. The patient was prepped and draped in the usual manner. The pedicle was infiltrated with local anesthesia. The pedicle was sectioned with a #15 blade. The pedicle was de-bulked and trimmed to match the shape of the defect. Hemostasis was achieved. The flap donor site and free margin of the flap were secured with deep buried sutures and the wound edges were re-approximated. Melolabial Interpolation Flap Division And Inset Text: Division and inset of the melolabial interpolation flap was performed to achieve optimal aesthetic result, restore normal anatomic appearance and avoid distortion of normal anatomy, expedite and facilitate wound healing, achieve optimal functional result and because linear closure either not possible or would produce suboptimal result. The patient was prepped and draped in the usual manner. The pedicle was infiltrated with local anesthesia. The pedicle was sectioned with a #15 blade. The pedicle was de-bulked and trimmed to match the shape of the defect. Hemostasis was achieved. The flap donor site and free margin of the flap were secured with deep buried sutures and the wound edges were re-approximated. Mastoid Interpolation Flap Division And Inset Text: Division and inset of the mastoid interpolation flap was performed to achieve optimal aesthetic result, restore normal anatomic appearance and avoid distortion of normal anatomy, expedite and facilitate wound healing, achieve optimal functional result and because linear closure either not possible or would produce suboptimal result. The patient was prepped and draped in the usual manner. The pedicle was infiltrated with local anesthesia. The pedicle was sectioned with a #15 blade. The pedicle was de-bulked and trimmed to match the shape of the defect. Hemostasis was achieved. The flap donor site and free margin of the flap were secured with deep buried sutures and the wound edges were re-approximated. Paramedian Forehead Flap Division And Inset Text: Division and inset of the paramedian forehead flap was performed to achieve optimal aesthetic result, restore normal anatomic appearance and avoid distortion of normal anatomy, expedite and facilitate wound healing, achieve optimal functional result and because linear closure either not possible or would produce suboptimal result. The patient was prepped and draped in the usual manner. The pedicle was infiltrated with local anesthesia. The pedicle was sectioned with a #15 blade. The pedicle was de-bulked and trimmed to match the shape of the defect. Hemostasis was achieved. The flap donor site and free margin of the flap were secured with deep buried sutures and the wound edges were re-approximated. Posterior Auricular Interpolation Flap Division And Inset Text: Division and inset of the posterior auricular interpolation flap was performed to achieve optimal aesthetic result, restore normal anatomic appearance and avoid distortion of normal anatomy, expedite and facilitate wound healing, achieve optimal functional result and because linear closure either not possible or would produce suboptimal result. The patient was prepped and draped in the usual manner. The pedicle was infiltrated with local anesthesia. The pedicle was sectioned with a #15 blade. The pedicle was de-bulked and trimmed to match the shape of the defect. Hemostasis was achieved. The flap donor site and free margin of the flap were secured with deep buried sutures and the wound edges were re-approximated. Manual Repair Warning Statement: We plan on removing the manually selected variable below in favor of our much easier automatic structured text blocks found in the previous tab. We decided to do this to help make the flow better and give you the full power of structured data. Manual selection is never going to be ideal in our platform and I would encourage you to avoid using manual selection from this point on, especially since I will be sunsetting this feature. It is important that you do one of two things with the customized text below. First, you can save all of the text in a word file so you can have it for future reference. Second, transfer the text to the appropriate area in the Library tab. Lastly, if there is a flap or graft type which we do not have you need to let us know right away so I can add it in before the variable is hidden. No need to panic, we plan to give you roughly 6 months to make the change. Consent: The rationale for the repair was explained to the patient and consent was obtained. The risks, benefits and alternatives to therapy were discussed in detail. Specifically, the risks of infection, scarring, bleeding, prolonged wound healing, incomplete removal, allergy to anesthesia, nerve injury and recurrence were addressed. Prior to the procedure, the treatment site was clearly identified and confirmed by the patient. All components of Universal Protocol/PAUSE Rule completed. Post-Care Instructions: I reviewed with the patient in detail post-care instructions. Patient is not to engage in any heavy lifting, exercise, or swimming for the next 14 days . Should the patient develop any fevers, chills, bleeding, severe pain patient will contact the office immediately. Pain Refusal Text: I offered to prescribe pain medication but the patient refused to take this medication. Show Asc Variables: Yes Where Do You Want The Question To Include Opioid Counseling Located?: Case Summary Tab Information: Selecting Yes will display possible errors in your note based on the variables you have selected. This validation is only offered as a suggestion for you. PLEASE NOTE THAT THE VALIDATION TEXT WILL BE REMOVED WHEN YOU FINALIZE YOUR NOTE. IF YOU WANT TO FAX A PRELIMINARY NOTE YOU WILL NEED TO TOGGLE THIS TO 'NO' IF YOU DO NOT WANT IT IN YOUR FAXED NOTE.

## 2021-01-14 NOTE — H&P PST ADULT - BLOOD TRANSFUSION, PREVIOUS, PROFILE
Chief Complaint   Patient presents with   • Follow-up     medications needs 90 day supply, would like Gabapentin refilled by Dr Conway     Patient is here for follow up on multiple medical problems.    1. Diabetes mellitus type 2   2. Hypertension   3. Dyslipidemia   4. Coronary artery disease   5. Hypothyroidism   6. Chronic low back pain   7. Visit for screening mammogram     HISTORY OF PRESENT ILLNESS:  The patient is a 71 year old female who is here today for follow up of multiple medical problems:    1. Type 2 diabetes mellitus without complication, without long-term current use of insulin:   Patient's A1c in the clinic today is 5.6 %.  Patient's blood sugar was   Glucose   Date Value Ref Range Status   10/26/2020 91 65 - 99 mg/dL Final   11/11/2019 101 (H) 65 - 99 mg/dL Final   08/17/2018 125 (H) 65 - 99 mg/dL Final    and hemoglobin A1c was   Hemoglobin A1C   Date Value Ref Range Status   10/26/2020 5.7 (H) 4.5 - 5.6 % Final     Comment:       Diabetic Screening  Non Diabetic:             <5.7%  Increased Risk:           5.7-6.4%  Diagnostic For Diabetes:  >6.4%    Diabetic Control  A1C%       eAG mg/dL  6.0            126  6.5            140  7.0            154  7.5            169  8.0            183  8.5            197  9.0            212  9.5            226  10.0           240   11/11/2019 6.1 (H) 4.5 - 5.6 % Final     Comment:        ----DIABETIC SCREENING---  NON DIABETIC                 <5.7%  INCREASED RISK                5.7-6.4%  DIAGNOSTIC FOR DIABETES      >6.4%     ----DIABETIC CONTROL---     A1C%           eAG mg/dL  6.0            126  6.5            140  7.0            154  7.5            169  8.0            183  8.5            197  9.0            212  9.5            226  10.0           240     11/30/2017 6.7 (H) 4.5 - 5.6 % Final     Comment:        ----DIABETIC SCREENING---  NON DIABETIC                 <5.7%  INCREASED RISK                5.7-6.4%  DIAGNOSTIC FOR DIABETES      >6.4%      ----DIABETIC CONTROL---     A1C%           eAG mg/dL  6.0            126  6.5            140  7.0            154  7.5            169  8.0            183  8.5            197  9.0            212  9.5            226  10.0           240     .  No polyuria, polydipsia, or polyphagia. No hypoglycemic symptoms.  Patient is tolerating medications well.  Medications reviewed in Smart Chart.  Discussed with patient annual diabetic eye exam.    2. Hypertension:  Blood pressure today is 132/70.  Medications are reviewed in Smart Chart.  Patient is tolerating medications well.  Patient denies any chest pain, palpitations or shortness of breath.  No dizziness, syncope or near syncope symptoms. No TIA (transient ischemic attack) or CVA (cerebrovascular accident)-like symptoms. Most recent lab work shows   Creatinine   Date Value Ref Range Status   10/26/2020 0.64 0.51 - 0.95 mg/dL Final   11/11/2019 0.55 0.51 - 0.95 mg/dL Final   08/17/2018 0.55 0.51 - 0.95 mg/dL Final   ,   BUN   Date Value Ref Range Status   10/26/2020 17 6 - 20 mg/dL Final   11/11/2019 19 6 - 20 mg/dL Final   08/17/2018 17 6 - 20 mg/dL Final   ,   Sodium   Date Value Ref Range Status   10/26/2020 140 135 - 145 mmol/L Final   11/11/2019 145 135 - 145 mmol/L Final   08/17/2018 141 135 - 145 mmol/L Final   ,   Potassium   Date Value Ref Range Status   10/26/2020 4.0 3.4 - 5.1 mmol/L Final   11/11/2019 4.3 3.4 - 5.1 mmol/L Final   08/17/2018 4.3 3.4 - 5.1 mmol/L Final   .  The rest of lab work was reviewed per Smart Chart.    3. Dyslipidemia:  Patient's most recent lipid panel shows   CHOLESTEROL   Date Value Ref Range Status   11/11/2019 178 <200 mg/dL Final     Comment:        Desirable            <200  Borderline High      200 to 239  High                 >=240        08/17/2018 168 <200 mg/dL Final     Comment:        Desirable            <200  Borderline High      200 to 239  High                 >=240          Cholesterol   Date Value Ref Range Status    10/26/2020 219 (H) <=199 mg/dL Final     Comment:     Desirable         <200  Borderline High   200 to 239  High              >=240      CALCULATED LDL   Date Value Ref Range Status   11/11/2019 97 <130 mg/dL Final     Comment:     OPTIMAL               <100  NEAR OPTIMAL          100-129  BORDERLINE HIGH       130-159  HIGH                  160-189  VERY HIGH             >=190     08/17/2018 86 <130 mg/dL Final     Comment:     OPTIMAL               <100  NEAR OPTIMAL          100-129  BORDERLINE HIGH       130-159  HIGH                  160-189  VERY HIGH             >=190       LDL   Date Value Ref Range Status   10/26/2020 124 <=129 mg/dL Final     Comment:     OPTIMAL           <100  NEAR OPTIMAL      100 to 129  BORDERLINE HIGH   130 to 159  HIGH              160 to 189  VERY HIGH         >=190   ,   HDL   Date Value Ref Range Status   10/26/2020 84 >=50 mg/dL Final     Comment:     Low              <40  Borderline Low   40 to 49  Near Optimal     50 to 59  Optimal          >=60   11/11/2019 54 >49 mg/dL Final     Comment:     Low            <40  Borderline Low 40 to 49  Near Optimal   50 to 59  Optimal        >=60     08/17/2018 51 >49 mg/dL Final     Comment:     Low            <40  Borderline Low 40 to 49  Near Optimal   50 to 59  Optimal        >=60     ,   TRIGLYCERIDE   Date Value Ref Range Status   11/11/2019 137 <150 mg/dL Final     Comment:     Normal                   <150  Borderline High          150 to 199  High                     200 to 499  Very High                >=500     08/17/2018 156 (H) <150 mg/dL Final     Comment:     Normal                   <150  Borderline High          150 to 199  High                     200 to 499  Very High                >=500       Triglycerides   Date Value Ref Range Status   10/26/2020 55 <=149 mg/dL Final     Comment:     Normal            <150  Borderline High   150 to 199  High              200 to 499  Very High         >=500   ,   CHOL/HDL   Date Value  Ref Range Status   11/11/2019 3.3 <4.5 Final   08/17/2018 3.3 <4.5 Final     Cholesterol/ HDL Ratio   Date Value Ref Range Status   10/26/2020 2.6 <=4.4 Final   ,   ALT/SGPT   Date Value Ref Range Status   11/11/2019 29 <64 Units/L Final   08/17/2018 26 <79 Units/L Final     GPT/ALT   Date Value Ref Range Status   10/26/2020 22 <64 Units/L Final   ,   AST/SGOT   Date Value Ref Range Status   11/11/2019 19 <38 Units/L Final   08/17/2018 18 <38 Units/L Final     GOT/AST   Date Value Ref Range Status   10/26/2020 16 <=37 Units/L Final    were reviewed and discussed with patient.  Medications reviewed in Smart Chart, patient is tolerating medications well.  Patient is trying to watch the diet.  No abdominal pain or jaundice.  No muscle aches or pains.    4. Coronary artery disease:  Patient is clinically stable. She denies any chest pain or shortness of breath, no syncope or near syncopal symptoms.  No TIA (transient ischemic attack) or CVA (cerebrovascular accident)-like symptoms and no walking claudications.  She is tolerating her medications well.  Medications reviewed in Epic.    5. Hypothyroidism:  Patient is taking medications regularly.  Medications were reviewed in the Smart Chart.  No heat or cold intolerance.  No tremors, no palpitation, no recent significant weight change.  Most recent lab work shows   TSH   Date Value Ref Range Status   11/11/2019 2.153 0.350 - 5.000 mcUnits/mL Final   08/17/2018 1.430 0.350 - 5.000 mcUnits/mL Final     Comment:     Findings most consistent with euthyroid state, no additional testing suggested. TSH may be normal in patients with thyroid dysfunction and pituitary disease. Clinical correlation recommended.  (Reflex TSH algorithm is not recommended in hospitalized patients. A variety of drugs, as well as serious acute and chronic illnesses may alter thyroid function tests. Commonly implicated drugs include glucocorticoids, dopamine, carbamazepine, iodine,   amiodarone, lithium  and heparin.)       6. Chronic low back pain: Patient reprots chronic low back pain for which she takes gabapentin 300 mg nightly.  No side effects of medications.  She states that she was getting epidural injections in that regard but is now only taking the medication.  No sciatica. No loss of control of bowel or bladder.  No tingling, numbness or weakness of the lower extremities.  Patient is requesting a refill of gabapentin which was being filled by a different provider.     7. Visit for screening mammogram: Patient's last mammogram was on 5/22/2020. Patient was counseled on the importance of mammography, and is agreeable to scheduling one at this time.       Lab Services on 12/04/2020   Component Date Value Ref Range Status   • COLOR, URINALYSIS 12/04/2020 Yellow   Final   • APPEARANCE, URINALYSIS 12/04/2020 Clear   Final   • GLUCOSE, URINALYSIS 12/04/2020 Negative  Negative mg/dL Final   • BILIRUBIN, URINALYSIS 12/04/2020 Negative  Negative Final   • KETONES, URINALYSIS 12/04/2020 Negative  Negative mg/dL Final   • SPECIFIC GRAVITY, URINALYSIS 12/04/2020 1.020  1.005 - 1.030 Final   • OCCULT BLOOD, URINALYSIS 12/04/2020 Negative  Negative Final   • PH, URINALYSIS 12/04/2020 7.0  5.0 - 7.0 Final   • PROTEIN, URINALYSIS 12/04/2020 Negative  Negative mg/dL Final   • UROBILINOGEN, URINALYSIS 12/04/2020 0.2  0.2, 1.0 mg/dL Final   • NITRITE, URINALYSIS 12/04/2020 Negative  Negative Final   • LEUKOCYTE ESTERASE, URINALYSIS 12/04/2020 Small* Negative Final   • SQUAMOUS EPITHELIAL, URINALYSIS 12/04/2020 None Seen  None Seen, 1 to 5 /hpf Final   • ERYTHROCYTES, URINALYSIS 12/04/2020 None Seen  None Seen, 1 to 2 /hpf Final   • LEUKOCYTES, URINALYSIS 12/04/2020 11 to 25* None Seen, 1 to 5 /hpf Final   • BACTERIA, URINALYSIS 12/04/2020 Moderate* None Seen /hpf Final   • HYALINE CASTS, URINALYSIS 12/04/2020 None Seen  None Seen, 1 to 5 /lpf Final   • Urine, Bacterial Culture 12/04/2020 >100,000 CFU/mL Escherichia coli*   Final       Results for orders placed or performed during the hospital encounter of 07/28/15   ECG   Result Value Ref Range    REPORT TEXT       .  Normal sinus rhythm  Normal ECG  No previous ECGs available  Confirmed by ALLISON MONK STEPHANIE ([id number]) on 8/4/2015 10:34:38 AM         REVIEW OF SYSTEMS:  All systems are reviewed and are essentially negative except for as per history of present illness.    PAST MEDICAL HISTORY:  Past Medical History:   Diagnosis Date   • Acute pulmonary embolism (CMS/HCC)     DVT with PE after Orthopedic surgery on Achilles tendon   • Arthritis    • Atrial tachycardia (CMS/HCC)    • Blood clot associated with vein wall inflammation     3 P.E. , left leg   • Breast CA (CMS/HCC)     left    • Diabetes mellitus (CMS/HCC)     type 2   • Diverticulosis of colon (without mention of hemorrhage) 9/8/2011   • Dyslipidemia    • Essential (primary) hypertension    • Female bladder prolapse    • Heart attack (CMS/HCC) 10/14/2011   • Hyperglycemia    • Numbness and tingling     left hand   • Obesity    • Other and unspecified hyperlipidemia    • Personal history of colonic polyps 9/8/2011   • Pneumonia    • Thyroid condition    • Urinary incontinence    • UTI (urinary tract infection)     Severe       PAST SURGICAL HISTORY:  Past Surgical History:   Procedure Laterality Date   • Achilles tendon surgery Left 06/16/2010    Reattachment with Charlie excision and Achilles tendon debridement.     • Breast lumpectomy Left 08/15/2008    Dr. Rodriguez followed by radiation   • Cardiac catherization      cardiac ablation   • Carpal tunnel release      michelle   • Carpal tunnel release Right 09/15/2013    Dr. Fall   • Carpal tunnel release Left 10/03/2013    Dr. Fall   • Cheilectomy Left     Left metatarsophalangeal joint cheilectomy   • Colonoscopy diagnostic  9-8-2011    nml, f/u 5 yrs due to prior hx of polyps, Dr Fraser   • Colonoscopy remove lesion by snare  03/17/2006    diverticulosis,  hemorrhoids,  descending at 50cm and sigmoid bx's =adenomatous colonic mucosa    • Colonoscopy w/ biopsies and polypectomy  09/18/2017    Polyps removed, moderate left colonic diverticulosis is noted, small size nonbleeding internal hemorrhoids are noted, recall in 5 years Dr. Sebastien Vela, tubular, hyperplastic   • Dexa bone density axial skeleton  9/14/2010   • Eye surgery     • Hb cystoscopy  12/26/2011    TVT with Cystoscopy   • Heart electroconversion  08/01/2001    secondary to Arythmia   • Knee surgery Bilateral 1990   • Knee surgery Bilateral 1980    Knee Surgery   • Knee surgery Right 08/31/2018    Polythylene exchange and tibial revision ([facility])   • Pap smear  9/14/2010    WNL and HPV insufficient   • Pubovaginal sling  12/16/2011   • Tonsillectomy and adenoidectomy      as a child   • Total knee arthroplasty Right 05/28/2009   • Total knee arthroplasty Left 03/27/2009   • Tubal ligation     • Vagina surgery  10/19/2016    Anterior posterior repair - Dr. Arambula       SOCIAL HISTORY:  Social History     Socioeconomic History   • Marital status: /Civil Union     Spouse name: Not on file   • Number of children: 2   • Years of education: Not on file   • Highest education level: Not on file   Occupational History   • Occupation: HOUSE WIFE   Social Needs   • Financial resource strain: Not on file   • Food insecurity     Worry: Not on file     Inability: Not on file   • Transportation needs     Medical: Not on file     Non-medical: Not on file   Tobacco Use   • Smoking status: Never Smoker   • Smokeless tobacco: Never Used   Substance and Sexual Activity   • Alcohol use: Yes     Alcohol/week: 0.0 standard drinks     Frequency: Monthly or less     Drinks per session: 1 or 2     Binge frequency: Less than monthly     Comment: occasional, once a month    • Drug use: No   • Sexual activity: Not Currently     Birth control/protection: Post-menopausal   Lifestyle   • Physical activity     Days per week:  Not on file     Minutes per session: Not on file   • Stress: Not on file   Relationships   • Social connections     Talks on phone: Not on file     Gets together: Not on file     Attends Sikh service: Not on file     Active member of club or organization: Not on file     Attends meetings of clubs or organizations: Not on file     Relationship status: Not on file   • Intimate partner violence     Fear of current or ex partner: Not on file     Emotionally abused: Not on file     Physically abused: Not on file     Forced sexual activity: Not on file   Other Topics Concern   • Not on file   Social History Narrative   • Not on file       FAMILY HISTORY:   Family History   Problem Relation Age of Onset   • Cancer Father         brain tumor   • Hypertension Mother    • Heart disease Brother         a fib    • Thyroid Sister    • Clotting Disorder Sister         DVT   • Thyroid Sister    • Clotting Disorder Sister         DVT   • Thyroid Maternal Aunt        Current Outpatient Medications   Medication Sig Dispense Refill   • Multiple Vitamins-Minerals (multivitamin with minerals) tablet Take 1 tablet by mouth daily.     • levothyroxine 50 MCG tablet Take 1 tablet by mouth daily. VISIT NEEDED FOR FURTHER REFILLS 30 tablet 0   • metFORMIN (GLUCOPHAGE-XR) 500 MG 24 hr tablet Take 1 tablet by mouth 2 times daily (with meals). VISIT NEEDED FOR FURTHER REFILLS 60 tablet 0   • metoPROLOL tartrate (LOPRESSOR) 50 MG tablet Take 1 tablet by mouth daily. VISIT NEEDED FOR FURTHER REFILLS 30 tablet 0   • meloxicam (MOBIC) 15 MG tablet Take 1 tablet by mouth daily. VISIT NEEDED FOR FURTHER REFILLS 30 tablet 0   • blood glucose (ONE TOUCH ULTRA TEST) test strip TEST BLOOD SUGAR ONCE DAILY AS DIRECTED 100 strip 0   • VICTOZA 18 MG/3ML pen-injector INJECT SUBCUTANEOUSLY 1.8MG DAILY 27 mL 1   • Insulin Pen Needle (BD PEN NEEDLE DAVI U/F) 32G X 4 MM Misc Injecting Victoza daily 90 each 3   • gabapentin (NEURONTIN) 300 MG capsule Take 1  capsule by mouth every night for 3 days, then 1 capsule twice daily for 3 days. Take 1 capsule by mouth 3 times daily thereafter. 90 capsule 2   • sharps container 1 CONTAINER 1 each 1   • aspirin 81 MG tablet Take 81 mg by mouth daily.     • pravastatin (PRAVACHOL) 40 MG tablet Take 40 mg by mouth daily.         No current facility-administered medications for this visit.        ALLERGIES:   Allergen Reactions   • Adhesive   (Environmental)      Adhesive Bandages. Skin reactions.    • Ancef [Cefazolin Sodium]      Throat swelling, NOS.   • Levaquin      Burning and painful joints of wrist and ankles.   (Fluoroquinolone antibiotics).    • Shrimp Extract Allergy Skin Test HIVES     Other reaction(s): HIVES    Patient states able to eat small amounts of shrimp with no reaction   No Iodine contra-indications     • Shrimp   (Food) HIVES   • Cefazolin SWELLING     Throat swells         PHYSICAL EXAMINATION:   GENERAL:  The patient is awake, alert, oriented x3, in no acute distress.   Blood pressure 132/70, pulse 70, temperature 97.7 °F (36.5 °C), temperature source Temporal, resp. rate 20, height 5' 5\" (1.651 m), weight 95.8 kg.  HEENT:  Pupils equal, round, reactive to light and accommodation.  Extraocular muscles are intact.  Sclerae anicteric.  Conjunctivae clear.  Oropharynx is clear.  No ulcers, no exudate, no erythema, no abscess formation.  Tympanic membranes are clear and intact bilaterally.  No frontal or maxillary sinus tenderness bilaterally.  No mastoid tenderness bilaterally.  External auditory canals are clear bilaterally.  Airway is intact.   NECK:  Supple, no jugular venous distention.  No submandibular, cervical or supraclavicular lymphadenopathy.  No carotid bruits.  Normal symmetric carotid upstrokes.  No thyromegaly.  No thyroid nodules.  No palpable mass.   HEART:  Regular rate and rhythm.  S1 and S2 are normal.  No S3, S4, no murmur, no rub.   LUNGS:  Clear to auscultation bilaterally.  No rales or  rhonchi with good air entry bilaterally.  No respiratory distress.   ABDOMEN:  Soft, normoactive bowel sounds.  No tenderness.  No rebound tenderness. No palpable mass.  No pulsatile mass.  No hepatosplenomegaly.  No inguinal lymphadenopathy.  No CVA (costovertebral angle) tenderness bilaterally.   EXTREMITIES:  All 4 extremities with full range of movement.  No edema, no calf tenderness bilaterally, posterior tibial 2+ and equal bilaterally.   NEUROLOGICAL:  Cranial nerves 2-12 are intact.  Motor is 5/5 and equal in bilateral upper and lower extremities.  Deep tendon reflexes 2+ and equal in bilateral upper and lower extremities and symmetric.  Gait is steady.  Sensation is intact.  No acute motor or sensory deficits.   SKIN:  Warm and dry.  No rash.  No petechiae.     ASSESSMENT:     Orders Placed This Encounter   • MAMMO Screen w Jamaal Bilateral   • CBC with Automated Differential   • Comprehensive Metabolic Panel   • Urinalysis With Microscopy & Culture If Indicated   • Thyroid Stimulating Hormone Reflex   • Lipid Panel With Reflex   • Microalbumin Urine Random   • Thyroid Stimulating Hormone   • Multiple Vitamins-Minerals (multivitamin with minerals) tablet       (E11.9) Type 2 diabetes mellitus without complication, without long-term current use of insulin (CMS/[facility])  (primary encounter diagnosis)  Plan: CBC WITH DIFFERENTIAL, COMPREHENSIVE METABOLIC         PANEL, URINALYSIS WITH MICROSCOPY & CULTURE IF         INDICATED, THYROID STIMULATING HORMONE REFLEX,         LIPID PANEL WITH REFLEX, MICROALBUMIN URINE         RANDOM, THYROID STIMULATING HORMONE  Advised the patient of the importance of completely lab work and following up regularly due to multiple medical conditions.  Continue taking metformin 500 mg BID. Patient is to watch for any hypoglycemic symptoms. Advised patient with annual diabetic eye exam.  Patient is to schedule fasting lab work and call the clinic for results, reevaluate after lab work.      (I10) Benign essential hypertension  Plan: CBC WITH DIFFERENTIAL, COMPREHENSIVE METABOLIC         PANEL, URINALYSIS WITH MICROSCOPY & CULTURE IF         INDICATED, THYROID STIMULATING HORMONE REFLEX,         LIPID PANEL WITH REFLEX  Patient is clinically stable. Continue taking metoprolol 50 mg daily. Advised patient to monitor blood pressure. Patient is to schedule fasting lab work and call the clinic for results, reevaluate after lab work.     (E03.9) Hypothyroidism, unspecified type  Plan: THYROID STIMULATING HORMONE  Patient is clinically stable.  Continue taking levothyroxine 50 mcg daily until reevaluation of lab work.  Monitor TSH with lab work.      (I25.10) Coronary artery disease involving native coronary artery of native heart without angina pectoris  Plan: LIPID PANEL WITH REFLEX  Patient is clinically stable. Continue current management and modify risk factors.     (E78.5) Dyslipidemia  Plan: COMPREHENSIVE METABOLIC PANEL, LIPID PANEL WITH        REFLEX  Advised patient to continue to watch diet. Continue taking pravastatin 40 mg daily. Patient is to schedule fasting lab work and call the clinic for results, reevaluate after lab work.     (M54.5,  G89.29) Chronic low back pain without sciatica, unspecified back pain laterality  Patient is clinically stable, no new or worsening symptoms.  Continue taking gabapentin 300 mg nightly, she will contact the clinic when she needs a refill of medication.     (Z12.31) Visit for screening mammogram  Plan: MAMMO SCREENING W MAO BILATERAL  Patient was counseled on the importance of mammography, and is agreeable to scheduling one in May of this year.           On 1/14/2021, Shila JACKSON scribed the services personally performed by Hermes Conway MD.     The documentation recorded by the scribe accurately and completely reflects the service(s) I personally performed and the decisions made by me.          no

## 2021-05-03 ENCOUNTER — OUTPATIENT (OUTPATIENT)
Dept: OUTPATIENT SERVICES | Facility: HOSPITAL | Age: 46
LOS: 1 days | End: 2021-05-03
Payer: COMMERCIAL

## 2021-05-03 VITALS
WEIGHT: 145.06 LBS | SYSTOLIC BLOOD PRESSURE: 102 MMHG | RESPIRATION RATE: 14 BRPM | OXYGEN SATURATION: 100 % | DIASTOLIC BLOOD PRESSURE: 68 MMHG | HEART RATE: 63 BPM | TEMPERATURE: 98 F | HEIGHT: 61 IN

## 2021-05-03 DIAGNOSIS — K80.10 CALCULUS OF GALLBLADDER WITH CHRONIC CHOLECYSTITIS WITHOUT OBSTRUCTION: ICD-10-CM

## 2021-05-03 DIAGNOSIS — Z01.818 ENCOUNTER FOR OTHER PREPROCEDURAL EXAMINATION: ICD-10-CM

## 2021-05-03 DIAGNOSIS — Z98.890 OTHER SPECIFIED POSTPROCEDURAL STATES: Chronic | ICD-10-CM

## 2021-05-03 LAB
ALBUMIN SERPL ELPH-MCNC: 4.2 G/DL — SIGNIFICANT CHANGE UP (ref 3.3–5)
ALP SERPL-CCNC: 58 U/L — SIGNIFICANT CHANGE UP (ref 30–120)
ALT FLD-CCNC: 24 U/L DA — SIGNIFICANT CHANGE UP (ref 10–60)
ANION GAP SERPL CALC-SCNC: 10 MMOL/L — SIGNIFICANT CHANGE UP (ref 5–17)
AST SERPL-CCNC: 13 U/L — SIGNIFICANT CHANGE UP (ref 10–40)
BILIRUB SERPL-MCNC: 0.9 MG/DL — SIGNIFICANT CHANGE UP (ref 0.2–1.2)
BLD GP AB SCN SERPL QL: SIGNIFICANT CHANGE UP
BUN SERPL-MCNC: 10 MG/DL — SIGNIFICANT CHANGE UP (ref 7–23)
CALCIUM SERPL-MCNC: 9.3 MG/DL — SIGNIFICANT CHANGE UP (ref 8.4–10.5)
CHLORIDE SERPL-SCNC: 104 MMOL/L — SIGNIFICANT CHANGE UP (ref 96–108)
CO2 SERPL-SCNC: 25 MMOL/L — SIGNIFICANT CHANGE UP (ref 22–31)
CREAT SERPL-MCNC: 0.83 MG/DL — SIGNIFICANT CHANGE UP (ref 0.5–1.3)
GLUCOSE SERPL-MCNC: 103 MG/DL — HIGH (ref 70–99)
HCT VFR BLD CALC: 42.1 % — SIGNIFICANT CHANGE UP (ref 34.5–45)
HGB BLD-MCNC: 14.2 G/DL — SIGNIFICANT CHANGE UP (ref 11.5–15.5)
MCHC RBC-ENTMCNC: 29.5 PG — SIGNIFICANT CHANGE UP (ref 27–34)
MCHC RBC-ENTMCNC: 33.7 GM/DL — SIGNIFICANT CHANGE UP (ref 32–36)
MCV RBC AUTO: 87.5 FL — SIGNIFICANT CHANGE UP (ref 80–100)
NRBC # BLD: 0 /100 WBCS — SIGNIFICANT CHANGE UP (ref 0–0)
PLATELET # BLD AUTO: 268 K/UL — SIGNIFICANT CHANGE UP (ref 150–400)
POTASSIUM SERPL-MCNC: 4.4 MMOL/L — SIGNIFICANT CHANGE UP (ref 3.5–5.3)
POTASSIUM SERPL-SCNC: 4.4 MMOL/L — SIGNIFICANT CHANGE UP (ref 3.5–5.3)
PROT SERPL-MCNC: 7.8 G/DL — SIGNIFICANT CHANGE UP (ref 6–8.3)
RBC # BLD: 4.81 M/UL — SIGNIFICANT CHANGE UP (ref 3.8–5.2)
RBC # FLD: 12.2 % — SIGNIFICANT CHANGE UP (ref 10.3–14.5)
SODIUM SERPL-SCNC: 139 MMOL/L — SIGNIFICANT CHANGE UP (ref 135–145)
WBC # BLD: 4.55 K/UL — SIGNIFICANT CHANGE UP (ref 3.8–10.5)
WBC # FLD AUTO: 4.55 K/UL — SIGNIFICANT CHANGE UP (ref 3.8–10.5)

## 2021-05-03 PROCEDURE — G0463: CPT

## 2021-05-03 NOTE — H&P PST ADULT - NSICDXPASTMEDICALHX_GEN_ALL_CORE_FT
PAST MEDICAL HISTORY:  COVID-19 vaccine series completed pfizer 3/20/21    Gall stones     GERD (gastroesophageal reflux disease)

## 2021-05-03 NOTE — H&P PST ADULT - NSICDXPASTSURGICALHX_GEN_ALL_CORE_FT
PAST SURGICAL HISTORY:  H/O myomectomy 09/2017    H/O right breast biopsy excisional biopsy 2020 benign    History of D&C polypectomy 2019

## 2021-05-03 NOTE — H&P PST ADULT - ASSESSMENT
46 y/o  44 y/o female with gall stones     Scheduled for laparoscopic cholecystectomy   Medical clearance   Pre op instructions  COVID testing at [facility] on admit

## 2021-05-04 PROBLEM — K80.20 CALCULUS OF GALLBLADDER WITHOUT CHOLECYSTITIS WITHOUT OBSTRUCTION: Chronic | Status: ACTIVE | Noted: 2021-05-03

## 2021-05-04 PROBLEM — K21.9 GASTRO-ESOPHAGEAL REFLUX DISEASE WITHOUT ESOPHAGITIS: Chronic | Status: ACTIVE | Noted: 2021-05-03

## 2021-05-04 RX ORDER — SODIUM CHLORIDE 9 MG/ML
1000 INJECTION, SOLUTION INTRAVENOUS
Refills: 0 | Status: DISCONTINUED | OUTPATIENT
Start: 2021-05-13 | End: 2021-05-27

## 2021-05-07 DIAGNOSIS — Z01.818 ENCOUNTER FOR OTHER PREPROCEDURAL EXAMINATION: ICD-10-CM

## 2021-05-10 ENCOUNTER — APPOINTMENT (OUTPATIENT)
Dept: DISASTER EMERGENCY | Facility: CLINIC | Age: 46
End: 2021-05-10

## 2021-05-10 ENCOUNTER — NON-APPOINTMENT (OUTPATIENT)
Age: 46
End: 2021-05-10

## 2021-05-10 NOTE — ASU DISCHARGE PLAN (ADULT/PEDIATRIC) - CARE PROVIDER_API CALL
Mika Del Toro (MD)  Surgery; Surgical Critical Care  [facility], [address] [place] [address]  Phone: [phone]  Fax: [phone]  Follow Up Time:

## 2021-05-10 NOTE — ASU DISCHARGE PLAN (ADULT/PEDIATRIC) - ASU DC SPECIAL INSTRUCTIONSFT
REST! Apply ice packs to incision sites 20 mins on, 20 mins off every 4 hours for the first 24 hours.    If taking narcotic pain medications, may take COLACE (OTC stool softener) as directed to prevent constipation.    Increase ambulation and utilize incentive spirometer as directed.    Please call Dr. PAIGE Del Toro's office ([phone]) to schedule a follow-up appointment to be seen in 7-10 days. REST! Apply ice packs to incision sites 20 mins on, 20 mins off every 4 hours for the first 24 hours.    If taking narcotic pain medications, may take COLACE (OTC stool softener) as directed to prevent constipation.    Increase ambulation and utilize incentive spirometer as directed.  May take over-the-counter acetaminophen (Tylenol) or ibuprofen (Advil, Motrin) as directed for pain.    Please call Dr. PAIGE Del Toro's office ([phone]) to schedule a follow-up appointment to be seen in 7-10 days.

## 2021-05-11 ENCOUNTER — APPOINTMENT (OUTPATIENT)
Dept: WOUND CARE | Facility: CLINIC | Age: 46
End: 2021-05-11

## 2021-05-11 LAB — SARS-COV-2 N GENE NPH QL NAA+PROBE: NOT DETECTED

## 2021-05-12 ENCOUNTER — TRANSCRIPTION ENCOUNTER (OUTPATIENT)
Age: 46
End: 2021-05-12

## 2021-05-12 NOTE — ASU PATIENT PROFILE, ADULT - PSH
H/O myomectomy  09/2017  H/O right breast biopsy  excisional biopsy 2020 benign  History of D&C  polypectomy 2019

## 2021-05-12 NOTE — ASU PATIENT PROFILE, ADULT - PMH
COVID-19 vaccine series completed  pfizer 3/20/21  Gall stones    GERD (gastroesophageal reflux disease)

## 2021-05-13 ENCOUNTER — RESULT REVIEW (OUTPATIENT)
Age: 46
End: 2021-05-13

## 2021-05-13 ENCOUNTER — OUTPATIENT (OUTPATIENT)
Dept: OUTPATIENT SERVICES | Facility: HOSPITAL | Age: 46
LOS: 1 days | End: 2021-05-13
Payer: COMMERCIAL

## 2021-05-13 VITALS
OXYGEN SATURATION: 99 % | SYSTOLIC BLOOD PRESSURE: 115 MMHG | WEIGHT: 145.95 LBS | HEART RATE: 72 BPM | TEMPERATURE: 97 F | RESPIRATION RATE: 15 BRPM | HEIGHT: 62 IN

## 2021-05-13 VITALS
RESPIRATION RATE: 21 BRPM | TEMPERATURE: 98 F | OXYGEN SATURATION: 98 % | SYSTOLIC BLOOD PRESSURE: 117 MMHG | HEART RATE: 63 BPM | DIASTOLIC BLOOD PRESSURE: 72 MMHG

## 2021-05-13 DIAGNOSIS — Z98.890 OTHER SPECIFIED POSTPROCEDURAL STATES: Chronic | ICD-10-CM

## 2021-05-13 DIAGNOSIS — K80.10 CALCULUS OF GALLBLADDER WITH CHRONIC CHOLECYSTITIS WITHOUT OBSTRUCTION: ICD-10-CM

## 2021-05-13 DIAGNOSIS — Z01.818 ENCOUNTER FOR OTHER PREPROCEDURAL EXAMINATION: ICD-10-CM

## 2021-05-13 DIAGNOSIS — K80.12 CALCULUS OF GALLBLADDER WITH ACUTE AND CHRONIC CHOLECYSTITIS WITHOUT OBSTRUCTION: ICD-10-CM

## 2021-05-13 LAB
ABO RH CONFIRMATION: SIGNIFICANT CHANGE UP
HCG UR QL: NEGATIVE — SIGNIFICANT CHANGE UP

## 2021-05-13 PROCEDURE — 88304 TISSUE EXAM BY PATHOLOGIST: CPT | Mod: 26

## 2021-05-13 PROCEDURE — 36415 COLL VENOUS BLD VENIPUNCTURE: CPT

## 2021-05-13 PROCEDURE — C1889: CPT

## 2021-05-13 PROCEDURE — 47562 LAPAROSCOPIC CHOLECYSTECTOMY: CPT

## 2021-05-13 PROCEDURE — 81025 URINE PREGNANCY TEST: CPT

## 2021-05-13 PROCEDURE — 47562 LAPAROSCOPIC CHOLECYSTECTOMY: CPT | Mod: AS

## 2021-05-13 PROCEDURE — 88304 TISSUE EXAM BY PATHOLOGIST: CPT

## 2021-05-13 RX ORDER — ONDANSETRON 8 MG/1
4 TABLET, FILM COATED ORAL ONCE
Refills: 0 | Status: DISCONTINUED | OUTPATIENT
Start: 2021-05-13 | End: 2021-05-13

## 2021-05-13 RX ORDER — SODIUM CHLORIDE 9 MG/ML
1000 INJECTION, SOLUTION INTRAVENOUS
Refills: 0 | Status: DISCONTINUED | OUTPATIENT
Start: 2021-05-13 | End: 2021-05-13

## 2021-05-13 RX ORDER — HYDROMORPHONE HYDROCHLORIDE 2 MG/ML
0.5 INJECTION INTRAMUSCULAR; INTRAVENOUS; SUBCUTANEOUS
Refills: 0 | Status: DISCONTINUED | OUTPATIENT
Start: 2021-05-13 | End: 2021-05-13

## 2021-05-13 RX ORDER — OXYCODONE HYDROCHLORIDE 5 MG/1
1 TABLET ORAL
Qty: 12 | Refills: 0
Start: 2021-05-13 | End: 2021-05-15

## 2021-05-13 RX ORDER — CEFAZOLIN SODIUM 1 G
2000 VIAL (EA) INJECTION ONCE
Refills: 0 | Status: COMPLETED | OUTPATIENT
Start: 2021-05-13 | End: 2021-05-13

## 2021-05-13 RX ORDER — OXYCODONE HYDROCHLORIDE 5 MG/1
5 TABLET ORAL ONCE
Refills: 0 | Status: DISCONTINUED | OUTPATIENT
Start: 2021-05-13 | End: 2021-05-13

## 2021-05-13 RX ORDER — CHLORHEXIDINE GLUCONATE 213 G/1000ML
1 SOLUTION TOPICAL ONCE
Refills: 0 | Status: COMPLETED | OUTPATIENT
Start: 2021-05-13 | End: 2021-05-13

## 2021-05-13 RX ADMIN — HYDROMORPHONE HYDROCHLORIDE 0.5 MILLIGRAM(S): 2 INJECTION INTRAMUSCULAR; INTRAVENOUS; SUBCUTANEOUS at 11:24

## 2021-05-13 RX ADMIN — OXYCODONE HYDROCHLORIDE 5 MILLIGRAM(S): 5 TABLET ORAL at 13:20

## 2021-05-13 RX ADMIN — HYDROMORPHONE HYDROCHLORIDE 0.5 MILLIGRAM(S): 2 INJECTION INTRAMUSCULAR; INTRAVENOUS; SUBCUTANEOUS at 11:29

## 2021-05-13 RX ADMIN — SODIUM CHLORIDE 75 MILLILITER(S): 9 INJECTION, SOLUTION INTRAVENOUS at 11:25

## 2021-05-13 RX ADMIN — CHLORHEXIDINE GLUCONATE 1 APPLICATION(S): 213 SOLUTION TOPICAL at 08:26

## 2021-05-13 RX ADMIN — OXYCODONE HYDROCHLORIDE 5 MILLIGRAM(S): 5 TABLET ORAL at 12:49

## 2021-05-13 NOTE — BRIEF OPERATIVE NOTE - NSICDXBRIEFPOSTOP_GEN_ALL_CORE_FT
POST-OP DIAGNOSIS:  Cholelithiasis with acute on chronic cholecystitis 13-May-2021 10:56:41  Elizabet Haddad

## 2021-05-13 NOTE — BRIEF OPERATIVE NOTE - NSICDXBRIEFPREOP_GEN_ALL_CORE_FT
PRE-OP DIAGNOSIS:  Cholelithiasis with acute on chronic cholecystitis 13-May-2021 10:55:52  Elizabet Haddad

## 2022-01-04 NOTE — H&P PST ADULT - HISTORY OF PRESENT ILLNESS
wheelchair
46 y/o female with 4 month history of abdominal pain and vomiting. Reports epigastric pain with radiation to entire abdomen  Pain aggravates with eating citrus food . Sonogram was done which showed gall stones . scheduled for laparoscopic cholecystectomy on 5/13/21

## 2023-07-19 NOTE — H&P PST ADULT - NSANTHOBSERVEDRD_ENT_A_CORE
Impression: Keratoconjunct sicca, not specified as Sjogren's, bilateral: H83.875. Plan: See dry eye plan. No

## 2023-09-03 NOTE — H&P PST ADULT - VENOUS THROMBOEMBOLISM CURRENT STATUS
Patient ID: [id number]     Chief Complaint:  Sinus pain    History of Present Illness:     Carmen Bryan is a 61 y.o. female  who presents today for symptoms of Sinus Problem (Pt presents to clinic with c/o facial pain, congestion, cough starting two and a half weeks ago. Otc meds ineffective. Declines testing in clinic. )  61-year-old female with no significant past medical history presents today with a 1 and half week history general cold symptoms which resolved for a couple days, after which she develops severe facial pain in the frontal and maxillary sinuses such that she had take 1 of her 's pain pills last night to get some sleep.    Pt denies experiencing any fevers, chills, nausea, vomiting, difficulty breathing, dysphagia, or neck stiffness.    Past Medical History:     ----------------------------  GERD (gastroesophageal reflux disease)     Past Surgical History:   Procedure Laterality Date    CHOLECYSTECTOMY      COLONOSCOPY N/A 7/12/2022    Procedure: COLON;  Surgeon: Wagner Orta MD;  Location: [facility] ENDOSCOPY;  Service: Gastroenterology;  Laterality: N/A;    EGD, WITH HEMORRHAGE CONTROL  7/11/2022    Procedure: EGD,WITH HEMORRHAGE CONTROL;  Surgeon: Wagner Orta MD;  Location: [facility] ENDOSCOPY;  Service: Gastroenterology;;  Argon Cirucmfrential Fire, Small Bowel AVM x1    ESOPHAGOGASTRODUODENOSCOPY N/A 7/11/2022    Procedure: EGD (ESOPHAGOGASTRODUODENOSCOPY);  Surgeon: Wagner Orta MD;  Location: [facility] ENDOSCOPY;  Service: Gastroenterology;  Laterality: N/A;       Review of patient's allergies indicates:  No Known Allergies    Outpatient Medications Marked as Taking for the 9/3/23 encounter (Office Visit) with Sai Jacobson MD   Medication Sig Dispense Refill    ALPRAZolam (XANAX) 0.25 MG tablet Take 0.25 mg by mouth.      cetirizine (ZYRTEC) 10 MG tablet Take 10 mg by mouth.      docusate sodium (COLACE) 100 MG capsule Take 100 mg by mouth.       "ferrous sulfate 324 mg (65 mg iron) TbEC Take 324 mg by mouth.      furosemide (LASIX) 20 MG tablet Take 20 mg by mouth.      melatonin oral solution Take by mouth every evening.      pantoprazole (PROTONIX) 40 MG tablet Take 40 mg by mouth.         Social History     Socioeconomic History    Marital status:    Tobacco Use    Smoking status: Never    Smokeless tobacco: Never   Substance and Sexual Activity    Alcohol use: Yes     Alcohol/week: 3.0 standard drinks of alcohol     Types: 3 Cans of beer per week     Comment: occasionally        Family History   Problem Relation Age of Onset    Hypertension Mother     Kidney disease Mother     Stroke Father     Cancer Father     Diabetes Father     Hypertension Father         Subjective:     Review of Systems   Constitutional:  Negative for chills, fever and malaise/fatigue.   HENT:  Positive for congestion and sinus pain. Negative for ear discharge, ear pain and sore throat.    Respiratory:  Positive for cough. Negative for sputum production, shortness of breath, wheezing and stridor.    Gastrointestinal:  Negative for abdominal pain, diarrhea, nausea and vomiting.   Genitourinary:  Negative for dysuria, frequency and urgency.   Musculoskeletal:  Negative for neck pain.   Skin:  Negative for rash.   Neurological:  Negative for headaches.       Objective:     /78   Pulse 83   Temp 98.4 °F (36.9 °C)   Ht 5' 3" (1.6 m)   Wt 88 kg (194 lb)   SpO2 96%   BMI 34.37 kg/m²     Physical Exam  Constitutional:       General: She is not in acute distress.     Appearance: Normal appearance. She is not ill-appearing or toxic-appearing.   HENT:      Head: Normocephalic and atraumatic.      Right Ear: Tympanic membrane and ear canal normal.      Left Ear: Tympanic membrane and ear canal normal.      Nose: No congestion or rhinorrhea.      Mouth/Throat:      Pharynx: Oropharynx is clear. No oropharyngeal exudate or posterior oropharyngeal erythema.   Eyes:      " General:         Right eye: No discharge.         Left eye: No discharge.      Extraocular Movements: Extraocular movements intact.      Conjunctiva/sclera: Conjunctivae normal.   Cardiovascular:      Rate and Rhythm: Normal rate and regular rhythm.      Heart sounds: Normal heart sounds. No murmur heard.     No gallop.   Pulmonary:      Effort: Pulmonary effort is normal. No respiratory distress.      Breath sounds: Normal breath sounds. No stridor. No wheezing, rhonchi or rales.   Chest:      Chest wall: No tenderness.   Musculoskeletal:      Cervical back: No rigidity or tenderness.   Lymphadenopathy:      Cervical: No cervical adenopathy.   Neurological:      Mental Status: She is alert and oriented to person, place, and time. Mental status is at baseline.   Psychiatric:         Mood and Affect: Mood normal.         Behavior: Behavior normal.         Assessment & Plan:       ICD-10-CM ICD-9-CM   1. Bacterial sinusitis  J32.9 473.9    B96.89 041.9        1. Bacterial sinusitis  -     predniSONE (DELTASONE) 20 MG tablet; Take 2 tablets (40 mg total) by mouth once daily. for 4 days  Dispense: 8 tablet; Refill: 0    Other orders  -     amoxicillin-clavulanate 875-125mg (AUGMENTIN) 875-125 mg per tablet; Take 1 tablet by mouth every 12 (twelve) hours. for 7 days  Dispense: 14 tablet; Refill: 0       Classic double sickening qualifies her for Augmentin treatment for bacterial sinusitis pursuant to Infectious Disease society of [place] guidelines.  Steroids for symptomatic relief.  Return precautions given.     (0) indicator not present

## 2023-09-20 NOTE — PRE-ANESTHESIA EVALUATION ADULT - NSDENTALSD_ENT_ALL_CORE
The patient is Stable - Low risk of patient condition declining or worsening    Shift Goals  Clinical Goals: pain control  Patient Goals: pain control  Family Goals: not present    Progress made toward(s) clinical / shift goals:    Problem: Pain - Standard  Goal: Alleviation of pain or a reduction in pain to the patient’s comfort goal  Description: Target End Date:  Prior to discharge or change in level of care    Document on Vitals flowsheet    1.  Document pain using the appropriate pain scale per order or unit policy  2.  Educate and implement non-pharmacologic comfort measures (i.e. relaxation, distraction, massage, cold/heat therapy, etc.)  3.  Pain management medications as ordered  4.  Reassess pain after pain med administration per policy  5.  If opiods administered assess patient's response to pain medication is appropriate per POSS sedation scale  6.  Follow pain management plan developed in collaboration with patient and interdisciplinary team (including palliative care or pain specialists if applicable)  Outcome: Progressing       Patient is not progressing towards the following goals:       appears normal and intact

## 2024-01-22 NOTE — ASU PREOP CHECKLIST - BSA (M2)
Pt calling regarding Desvenlafaxine Rx. Pt stating PA needed. PA initiated & approved through [facility]. Pt notified.   
1.69

## 2024-03-10 NOTE — PRE-ANESTHESIA EVALUATION ADULT - NSANTHOSAYNRD_GEN_A_CORE
[id number]
No. CRISTIAN screening performed.  STOP BANG Legend: 0-2 = LOW Risk; 3-4 = INTERMEDIATE Risk; 5-8 = HIGH Risk

## 2025-02-10 NOTE — BRIEF OPERATIVE NOTE - NSICDXBRIEFPREOP_GEN_ALL_CORE_FT
PRE-OP DIAGNOSIS:  Flat epithelial atypia of right breast 01-Oct-2020 08:31:11  Yenny Cartagena   Wanda Colunga

## 2025-07-23 NOTE — H&P PST ADULT - CORNEAL ABRASION RISK
Chief Complaint   Patient presents with    Annual Exam       \"Have you been to the ER, urgent care clinic since your last visit?  Hospitalized since your last visit?\"    YES - When: approximately 1 months ago.  Where and Why: [facility].    “Have you seen or consulted any other health care providers outside of [facility] since your last visit?”    NO            Click Here for Release of Records Request       There were no vitals filed for this visit.   Health Maintenance Due   Topic Date Due    Varicella vaccine (1 of 2 - 13+ 2-dose series) Never done    Hepatitis B vaccine (1 of 3 - 19+ 3-dose series) Never done    DTaP/Tdap/Td vaccine (1 - Tdap) Never done    Pneumococcal 0-49 years Vaccine (1 of 2 - PCV) Never done    COVID-19 Vaccine ( -  season) Never done        The patient, Mary Yusuf, identity was verified by name and .     
Chief Complaint   Patient presents with    Annual Exam     HPI:  Mayr Yusuf is a 31 y.o. AA female with allergic rhinitis, asthma, vit D def presents annual physical exam. Patient is doing well. She has no complaints today.    Review of Systems  As per hpi    Past Medical History:   Diagnosis Date    Asthma     UTI (urinary tract infection)     Vitamin D deficiency      History reviewed. No pertinent surgical history.  Social History     Socioeconomic History    Marital status: Single     Spouse name: None    Number of children: None    Years of education: None    Highest education level: None   Tobacco Use    Smoking status: Never    Smokeless tobacco: Never   Substance and Sexual Activity    Alcohol use: Yes    Drug use: Never     Social Drivers of Health     Financial Resource Strain: Low Risk  (10/23/2023)    Overall Financial Resource Strain (CARDIA)     Difficulty of Paying Living Expenses: Not hard at all   Food Insecurity: No Food Insecurity (7/23/2025)    Hunger Vital Sign     Worried About Running Out of Food in the Last Year: Never true     Ran Out of Food in the Last Year: Never true   Transportation Needs: No Transportation Needs (7/23/2025)    PRAPARE - Transportation     Lack of Transportation (Medical): No     Lack of Transportation (Non-Medical): No   Housing Stability: Low Risk  (7/23/2025)    Housing Stability Vital Sign     Unable to Pay for Housing in the Last Year: No     Number of Times Moved in the Last Year: 0     Homeless in the Last Year: No     History reviewed. No pertinent family history.  Current Outpatient Medications   Medication Sig Dispense Refill    hydroquinone 4 % cream Apply topically 2 times daily. 28.35 g 2    loratadine (CLARITIN) 10 MG tablet Take 1 tablet by mouth daily 90 tablet 1    albuterol sulfate HFA (PROVENTIL;VENTOLIN;PROAIR) 108 (90 Base) MCG/ACT inhaler Inhale 1 puff into the lungs every 4 hours as needed for Wheezing 18 g 1    Cholecalciferol (VITAMIN D3) 125 
denies